# Patient Record
Sex: FEMALE | Race: WHITE | NOT HISPANIC OR LATINO | Employment: PART TIME | URBAN - METROPOLITAN AREA
[De-identification: names, ages, dates, MRNs, and addresses within clinical notes are randomized per-mention and may not be internally consistent; named-entity substitution may affect disease eponyms.]

---

## 2019-07-15 ENCOUNTER — APPOINTMENT (OUTPATIENT)
Dept: LAB | Facility: HOSPITAL | Age: 24
End: 2019-07-15

## 2019-07-15 ENCOUNTER — LAB (OUTPATIENT)
Dept: LAB | Facility: HOSPITAL | Age: 24
End: 2019-07-15

## 2019-07-15 ENCOUNTER — OFFICE VISIT (OUTPATIENT)
Dept: INTERNAL MEDICINE | Facility: CLINIC | Age: 24
End: 2019-07-15

## 2019-07-15 VITALS
BODY MASS INDEX: 33.43 KG/M2 | SYSTOLIC BLOOD PRESSURE: 122 MMHG | HEIGHT: 66 IN | HEART RATE: 56 BPM | TEMPERATURE: 98.4 F | DIASTOLIC BLOOD PRESSURE: 78 MMHG | WEIGHT: 208 LBS

## 2019-07-15 DIAGNOSIS — Z13.29 ENCOUNTER FOR SCREENING FOR ENDOCRINE DISORDER: ICD-10-CM

## 2019-07-15 DIAGNOSIS — Z13.21 ENCOUNTER FOR VITAMIN DEFICIENCY SCREENING: ICD-10-CM

## 2019-07-15 DIAGNOSIS — Z13.0 SCREENING FOR DEFICIENCY ANEMIA: ICD-10-CM

## 2019-07-15 DIAGNOSIS — R63.5 WEIGHT GAIN: ICD-10-CM

## 2019-07-15 DIAGNOSIS — J03.90 TONSILLITIS: ICD-10-CM

## 2019-07-15 DIAGNOSIS — Z13.220 SCREENING CHOLESTEROL LEVEL: ICD-10-CM

## 2019-07-15 DIAGNOSIS — Z30.9 ENCOUNTER FOR CONTRACEPTIVE MANAGEMENT, UNSPECIFIED TYPE: ICD-10-CM

## 2019-07-15 DIAGNOSIS — F41.9 ANXIETY: Primary | ICD-10-CM

## 2019-07-15 PROBLEM — R73.09 ABNORMAL GLUCOSE: Status: ACTIVE | Noted: 2019-07-15

## 2019-07-15 PROBLEM — E16.2 HYPOGLYCEMIA: Status: ACTIVE | Noted: 2019-07-15

## 2019-07-15 PROBLEM — G43.009 MIGRAINE WITHOUT AURA AND WITHOUT STATUS MIGRAINOSUS, NOT INTRACTABLE: Status: ACTIVE | Noted: 2019-07-15

## 2019-07-15 PROBLEM — J30.9 ALLERGIC RHINITIS: Status: ACTIVE | Noted: 2019-07-15

## 2019-07-15 LAB
25(OH)D3 SERPL-MCNC: 19.3 NG/ML (ref 30–100)
ALBUMIN SERPL-MCNC: 4.2 G/DL (ref 3.5–5.2)
ALBUMIN/GLOB SERPL: 1.4 G/DL
ALP SERPL-CCNC: 49 U/L (ref 39–117)
ALT SERPL W P-5'-P-CCNC: 28 U/L (ref 1–33)
ANION GAP SERPL CALCULATED.3IONS-SCNC: 12.4 MMOL/L (ref 5–15)
AST SERPL-CCNC: 23 U/L (ref 1–32)
BASOPHILS # BLD AUTO: 0.02 10*3/MM3 (ref 0–0.2)
BASOPHILS NFR BLD AUTO: 0.4 % (ref 0–1.5)
BILIRUB SERPL-MCNC: 0.3 MG/DL (ref 0.2–1.2)
BUN BLD-MCNC: 7 MG/DL (ref 6–20)
BUN/CREAT SERPL: 10.3 (ref 7–25)
CALCIUM SPEC-SCNC: 9.9 MG/DL (ref 8.6–10.5)
CHLORIDE SERPL-SCNC: 105 MMOL/L (ref 98–107)
CHOLEST SERPL-MCNC: 208 MG/DL (ref 0–200)
CO2 SERPL-SCNC: 21.6 MMOL/L (ref 22–29)
CREAT BLD-MCNC: 0.68 MG/DL (ref 0.57–1)
DEPRECATED RDW RBC AUTO: 44.7 FL (ref 37–54)
EOSINOPHIL # BLD AUTO: 0.05 10*3/MM3 (ref 0–0.4)
EOSINOPHIL NFR BLD AUTO: 0.9 % (ref 0.3–6.2)
ERYTHROCYTE [DISTWIDTH] IN BLOOD BY AUTOMATED COUNT: 13.1 % (ref 12.3–15.4)
GFR SERPL CREATININE-BSD FRML MDRD: 108 ML/MIN/1.73
GLOBULIN UR ELPH-MCNC: 2.9 GM/DL
GLUCOSE BLD-MCNC: 87 MG/DL (ref 65–99)
HCT VFR BLD AUTO: 41.9 % (ref 34–46.6)
HDLC SERPL-MCNC: 58 MG/DL (ref 40–60)
HGB BLD-MCNC: 13.8 G/DL (ref 12–15.9)
IMM GRANULOCYTES # BLD AUTO: 0.01 10*3/MM3 (ref 0–0.05)
IMM GRANULOCYTES NFR BLD AUTO: 0.2 % (ref 0–0.5)
LDLC SERPL CALC-MCNC: 130 MG/DL (ref 0–100)
LDLC/HDLC SERPL: 2.24 {RATIO}
LYMPHOCYTES # BLD AUTO: 2.27 10*3/MM3 (ref 0.7–3.1)
LYMPHOCYTES NFR BLD AUTO: 40.8 % (ref 19.6–45.3)
MCH RBC QN AUTO: 30.7 PG (ref 26.6–33)
MCHC RBC AUTO-ENTMCNC: 32.9 G/DL (ref 31.5–35.7)
MCV RBC AUTO: 93.3 FL (ref 79–97)
MONOCYTES # BLD AUTO: 0.37 10*3/MM3 (ref 0.1–0.9)
MONOCYTES NFR BLD AUTO: 6.7 % (ref 5–12)
NEUTROPHILS # BLD AUTO: 2.84 10*3/MM3 (ref 1.7–7)
NEUTROPHILS NFR BLD AUTO: 51 % (ref 42.7–76)
NRBC BLD AUTO-RTO: 0 /100 WBC (ref 0–0.2)
PLATELET # BLD AUTO: 313 10*3/MM3 (ref 140–450)
PMV BLD AUTO: 10.4 FL (ref 6–12)
POTASSIUM BLD-SCNC: 4.5 MMOL/L (ref 3.5–5.2)
PROT SERPL-MCNC: 7.1 G/DL (ref 6–8.5)
RBC # BLD AUTO: 4.49 10*6/MM3 (ref 3.77–5.28)
SODIUM BLD-SCNC: 139 MMOL/L (ref 136–145)
TRIGL SERPL-MCNC: 101 MG/DL (ref 0–150)
TSH SERPL DL<=0.05 MIU/L-ACNC: 3.88 MIU/ML (ref 0.27–4.2)
VIT B12 BLD-MCNC: 312 PG/ML (ref 211–946)
VLDLC SERPL-MCNC: 20.2 MG/DL (ref 5–40)
WBC NRBC COR # BLD: 5.56 10*3/MM3 (ref 3.4–10.8)

## 2019-07-15 PROCEDURE — 99214 OFFICE O/P EST MOD 30 MIN: CPT | Performed by: NURSE PRACTITIONER

## 2019-07-15 PROCEDURE — 80061 LIPID PANEL: CPT

## 2019-07-15 PROCEDURE — 82306 VITAMIN D 25 HYDROXY: CPT

## 2019-07-15 PROCEDURE — 80053 COMPREHEN METABOLIC PANEL: CPT

## 2019-07-15 PROCEDURE — 85025 COMPLETE CBC W/AUTO DIFF WBC: CPT

## 2019-07-15 PROCEDURE — 82607 VITAMIN B-12: CPT

## 2019-07-15 PROCEDURE — 84443 ASSAY THYROID STIM HORMONE: CPT

## 2019-07-15 RX ORDER — NORETHINDRONE ACETATE AND ETHINYL ESTRADIOL 1MG-20(21)
1 KIT ORAL DAILY
COMMUNITY
End: 2020-03-06

## 2019-07-15 RX ORDER — FLUOXETINE HYDROCHLORIDE 20 MG/1
1 CAPSULE ORAL DAILY
Refills: 10 | COMMUNITY
Start: 2019-05-20 | End: 2019-07-15

## 2019-07-15 RX ORDER — CHLORAL HYDRATE 500 MG
1000 CAPSULE ORAL
COMMUNITY
End: 2020-03-06

## 2019-07-15 RX ORDER — FLUOXETINE HYDROCHLORIDE 20 MG/1
20 CAPSULE ORAL DAILY
Qty: 30 CAPSULE | Refills: 2 | Status: SHIPPED | OUTPATIENT
Start: 2019-07-15 | End: 2020-01-14

## 2019-07-15 NOTE — PATIENT INSTRUCTIONS
" It may take 4-6 weeks to notice improvement.  If experience increased depression or develop suicidal thoughts contact me immediately at  my office.  This medication she not be stopped suddenly.    Labs today.    Restart Flonase daily.  Referral to ENT    The patient was counseled on goals and the need for weight reduction.   The body mass index (BMI), which is a ratio of weight to height, while not a perfect measurement of body fat, does correlate strongly with various metabolic and disease outcomes. A BMI in the 18.5 - 24.9 range is considered a normal or healthy weight. A BMI above 25 is considered overweight.  A BMI of 30 or above is considered obese.  A BMI of 35 and above with any chronic conditions (such as hypertension, hyperlipidemia or Coronary Artery Disease is considered morbidly obese. Even an initial 10% reduction in body weight can offer important health benefits and reduce long-term cardiovascular risk.     While there are an array of popular/fad diets being promoted, I generally recommend a diet of vegetables, fruits, whole grains, and lean protein sources including low-fat dairy products, poultry, and nuts with an emphasis toward minimizing intake of sweets, carbohydrates, and red meats. Obtaining 30-40 minutes of moderate to vigorous-intensity aerobic exercise on 4-5 days weekly will assist you in reaching your optimal weight, not to mention the cardiovascular benefit, although no amount of exercise will usually overcome the effects of a poor diet. (Moderate activity means you can talk, but not sing, while you are active.) While this may seem like an insurmountable task at first to some, adding 10 minutes of aerobic activity to your current amount of exercise gradually can help you reach the above goal. The National [place] of [employer]'s \"Aim for a Healthy Weight\" website offers practical information on how you may improve your current diet and exercise habits. I have included the website's URL " here for your reference: https://www.nhlbi.nih.gov/health/educational/lose_wt/index.htm. A nutritionist can help support you in developing an individualized diet plan. If you are interested, I would be happy to place a referral. Just let me know.

## 2019-07-15 NOTE — PROGRESS NOTES
"Dana Juarez  1995  [id number]  Patient Care Team:  Rosendo Hernandez MD as PCP - General (Internal Medicine)    Dana Juarez is a pleasant 24 y.o. female who presents for evaluation of Weight Loss (Patient is not having success and would like to get lab work done ) and swollen tonsils (mono and strep last yezar and her tonsils are still swollen )    This patient is accompanied by their mother who contributes to the history of their care.  Chief Complaint   Patient presents with   • Weight Loss     Patient is not having success and would like to get lab work done    • swollen tonsils     mono and strep last yezar and her tonsils are still swollen        HPI:   SHAILESH/panic attacks: Was on escitalopram for 18 mo which helped some but not completely effective.  She thought she should try a different SSRI and was put on prozac for a month, then she stopped try naturals about 6 weeks ago.  \"I realized this was not a good idea\". anxiety and depression have increased sig over past 2-3 weeks. Having panic attacks again, was 1-2 x weekly before prozac, when on SSRIs much less frequent.  No known triggers.  Has had 2 contacts in past 6-8 weeks.  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    Weight gain: on oral CLEMENTE x 4 years and has had about 40-50 lbs. same timeframe of going to college. Last mo joined weight watchers.  No reg exercise.  Tracking intake on their alfonzo.Wants labs.    Frequent ear aches and tonsillitis for past 2 yrs.  Last yr strep and mono, tonsils still swollen,  Non smoker, no snoring.    Past Medical History:   Diagnosis Date   • Irregular periods     heavy periods   • Seasonal allergies      History reviewed. No pertinent surgical history.  History reviewed. No pertinent family history.  Social History     Tobacco Use   Smoking Status Former Smoker   • Packs/day: 0.25   • Years: 0.50   • Pack years: 0.12   • " "Start date:    • Last attempt to quit: 2018   • Years since quittin.5   Smokeless Tobacco Never Used   Tobacco Comment    Smoked 6 months last year while in [place] for school      Allergies   Allergen Reactions   • Penicillins Rash     Possible allergy        Current Outpatient Medications:   •  norethindrone-ethinyl estradiol FE (JUNEL FE 1/20) 1-20 MG-MCG per tablet, Take 1 tablet by mouth Daily., Disp: , Rfl:   •  Nutritional Supplements (ADULT NUTRITIONAL SUPPLEMENT + PO), Adrenal Support- Take 1 tablet by oral route once daily, Disp: , Rfl:   •  Nutritional Supplements (ADULT NUTRITIONAL SUPPLEMENT PO), Anxiety: Take 3-5 tablets by mouth daily prn, Disp: , Rfl:   •  Omega-3 Fatty Acids (FISH OIL) 1000 MG capsule capsule, Take 1,000 mg by mouth Daily With Breakfast., Disp: , Rfl:   •  FLUoxetine (PROZAC) 20 MG capsule, Take 1 capsule by mouth Daily., Disp: 30 capsule, Rfl: 2    Review of Systems   Constitutional: Negative for chills, fatigue and fever.   HENT: Positive for ear pain. Negative for congestion and sinus pressure.    Respiratory: Positive for cough. Negative for chest tightness, shortness of breath and wheezing.    Cardiovascular: Negative for chest pain and palpitations.   Gastrointestinal: Negative for abdominal pain, blood in stool and constipation.   Skin: Negative for color change.   Allergic/Immunologic: Positive for environmental allergies.   Neurological: Positive for dizziness. Negative for speech difficulty and headache.   Psychiatric/Behavioral: Positive for decreased concentration. The patient is nervous/anxious.      /78 (BP Location: Left arm, Patient Position: Sitting, Cuff Size: Adult)   Pulse 56   Temp 98.4 °F (36.9 °C) (Temporal)   Ht 167.6 cm (66\")   Wt 94.3 kg (208 lb)   BMI 33.57 kg/m²     Physical Exam   Constitutional: She appears well-developed and well-nourished.   HENT:   Head: Normocephalic and atraumatic.   Right Ear: External ear normal. A middle ear " effusion is present.   Left Ear: External ear normal. A middle ear effusion is present.   Mouth/Throat: Uvula is midline. Posterior oropharyngeal erythema present. No oropharyngeal exudate, posterior oropharyngeal edema or tonsillar abscesses. Tonsils are 1+ on the right. Tonsils are 2+ on the left. No tonsillar exudate.   Eyes: Conjunctivae and EOM are normal.   Neck: Normal range of motion. Neck supple.   Cardiovascular: Normal rate, regular rhythm and normal heart sounds.   Pulmonary/Chest: Effort normal and breath sounds normal.   Abdominal: Soft. Bowel sounds are normal.   Musculoskeletal: Normal range of motion.   Lymphadenopathy:     She has no cervical adenopathy.   Neurological: She is alert.   Skin: Skin is warm and dry.   Psychiatric: She has a normal mood and affect. Her speech is normal and behavior is normal. Judgment and thought content normal. Her mood appears not anxious. Her affect is not angry, not blunt, not labile and not inappropriate. Cognition and memory are normal. She does not exhibit a depressed mood. She expresses no homicidal and no suicidal ideation. She expresses no suicidal plans and no homicidal plans.     Assessment/Plan:  Dana was seen today for weight loss and swollen tonsils.    Diagnoses and all orders for this visit:    Anxiety    Encounter for contraceptive management, unspecified type  Comments:  Refer to gynecology  Orders:  -     Ambulatory Referral to Gynecology    Weight gain  Comments:  Participating in weight watchers    Tonsillitis  Comments:  Recurrent, refer to ENT  Orders:  -     Ambulatory Referral to ENT (Otolaryngology)    BMI 33.0-33.9,adult    Encounter for screening for endocrine disorder  -     TSH Rfx On Abnormal To Free T4; Future    Screening cholesterol level  -     Comprehensive Metabolic Panel; Future  -     Lipid Panel; Future    Screening for deficiency anemia  -     CBC & Differential; Future    Encounter for vitamin deficiency screening  -      Vitamin D 25 Hydroxy; Future  -     Vitamin B12; Future    Other orders  -     FLUoxetine (PROZAC) 20 MG capsule; Take 1 capsule by mouth Daily.       Patient Instructions    It may take 4-6 weeks to notice improvement.  If experience increased depression or develop suicidal thoughts contact me immediately at  my office.  This medication she not be stopped suddenly.    Labs today.    Restart Flonase daily.  Referral to ENT    The patient was counseled on goals and the need for weight reduction.   The body mass index (BMI), which is a ratio of weight to height, while not a perfect measurement of body fat, does correlate strongly with various metabolic and disease outcomes. A BMI in the 18.5 - 24.9 range is considered a normal or healthy weight. A BMI above 25 is considered overweight.  A BMI of 30 or above is considered obese.  A BMI of 35 and above with any chronic conditions (such as hypertension, hyperlipidemia or Coronary Artery Disease is considered morbidly obese. Even an initial 10% reduction in body weight can offer important health benefits and reduce long-term cardiovascular risk.     While there are an array of popular/fad diets being promoted, I generally recommend a diet of vegetables, fruits, whole grains, and lean protein sources including low-fat dairy products, poultry, and nuts with an emphasis toward minimizing intake of sweets, carbohydrates, and red meats. Obtaining 30-40 minutes of moderate to vigorous-intensity aerobic exercise on 4-5 days weekly will assist you in reaching your optimal weight, not to mention the cardiovascular benefit, although no amount of exercise will usually overcome the effects of a poor diet. (Moderate activity means you can talk, but not sing, while you are active.) While this may seem like an insurmountable task at first to some, adding 10 minutes of aerobic activity to your current amount of exercise gradually can help you reach the above goal. The [place]  "of Health's \"Aim for a Healthy Weight\" website offers practical information on how you may improve your current diet and exercise habits. I have included the website's URL here for your reference: https://www.nhlbi.nih.gov/health/educational/lose_wt/index.htm. A nutritionist can help support you in developing an individualized diet plan. If you are interested, I would be happy to place a referral. Just let me know.           Plan of care reviewed with patient at the conclusion of today's visit. Education was provided regarding diagnosis, management and any prescribed or recommended OTC medications.  Patient verbalizes understanding of and agreement with management plan.    Return in about 1 month (around 8/15/2019).    *Note that portions of this note were completed with a voice recognition program.  Efforts were made to edit the dictation but occasionally words are transcribed.    CHRISTINA Martinez          "

## 2019-07-17 DIAGNOSIS — E78.2 MIXED HYPERLIPIDEMIA: ICD-10-CM

## 2019-07-17 DIAGNOSIS — R53.83 OTHER FATIGUE: ICD-10-CM

## 2019-07-17 DIAGNOSIS — E55.9 VITAMIN D DEFICIENCY: Primary | ICD-10-CM

## 2019-08-01 RX ORDER — NORETHINDRONE ACETATE AND ETHINYL ESTRADIOL AND FERROUS FUMARATE 1MG-20(21)
KIT ORAL
Qty: 112 TABLET | Refills: 0 | OUTPATIENT
Start: 2019-08-01

## 2020-01-14 RX ORDER — FLUOXETINE HYDROCHLORIDE 20 MG/1
20 CAPSULE ORAL DAILY
Qty: 30 CAPSULE | Refills: 2 | Status: SHIPPED | OUTPATIENT
Start: 2020-01-14 | End: 2020-05-08

## 2020-02-21 ENCOUNTER — TELEPHONE (OUTPATIENT)
Dept: INTERNAL MEDICINE | Facility: CLINIC | Age: 25
End: 2020-02-21

## 2020-02-21 NOTE — TELEPHONE ENCOUNTER
Repeat Lipis B12 Vit D ordered 7/17/19  Reminder letter sent to patient.   Labs have not been completed  Can order be canceled?

## 2020-03-06 ENCOUNTER — OFFICE VISIT (OUTPATIENT)
Dept: INTERNAL MEDICINE | Facility: CLINIC | Age: 25
End: 2020-03-06

## 2020-03-06 VITALS
BODY MASS INDEX: 35.36 KG/M2 | TEMPERATURE: 98.1 F | DIASTOLIC BLOOD PRESSURE: 80 MMHG | OXYGEN SATURATION: 98 % | HEART RATE: 60 BPM | SYSTOLIC BLOOD PRESSURE: 120 MMHG | WEIGHT: 220 LBS | HEIGHT: 66 IN | RESPIRATION RATE: 28 BRPM

## 2020-03-06 DIAGNOSIS — J35.1 TONSILLAR HYPERTROPHY: ICD-10-CM

## 2020-03-06 DIAGNOSIS — IMO0002 HX OF SELF-HARM: ICD-10-CM

## 2020-03-06 DIAGNOSIS — R41.840 POOR CONCENTRATION: ICD-10-CM

## 2020-03-06 DIAGNOSIS — F41.1 GAD (GENERALIZED ANXIETY DISORDER): Primary | ICD-10-CM

## 2020-03-06 DIAGNOSIS — F33.1 MODERATE EPISODE OF RECURRENT MAJOR DEPRESSIVE DISORDER (HCC): ICD-10-CM

## 2020-03-06 PROCEDURE — 99214 OFFICE O/P EST MOD 30 MIN: CPT | Performed by: NURSE PRACTITIONER

## 2020-03-06 RX ORDER — BUPROPION HYDROCHLORIDE 150 MG/1
150 TABLET ORAL EVERY MORNING
Qty: 30 TABLET | Refills: 2 | Status: SHIPPED | OUTPATIENT
Start: 2020-03-06 | End: 2020-04-01 | Stop reason: SDUPTHER

## 2020-03-06 RX ORDER — BUSPIRONE HYDROCHLORIDE 5 MG/1
5 TABLET ORAL 3 TIMES DAILY PRN
Qty: 90 TABLET | Refills: 2 | Status: SHIPPED | OUTPATIENT
Start: 2020-03-06 | End: 2020-05-08

## 2020-03-06 NOTE — PATIENT INSTRUCTIONS
Suicidal Feelings: How to Help Yourself  Suicide is when you end your own life. There are many things you can do to help yourself feel better when struggling with these feelings. Many services and people are available to support you and others who struggle with similar feelings.   If you ever feel like you may hurt yourself or others, or have thoughts about taking your own life, get help right away. To get help:  · Call your local emergency services (911 in the [place]  · Go to your nearest emergency department.  · Call a suicide hotline to speak with a trained counselor. The following suicide hotlines are available in the [place]:  ? 3-491-913-TALK (1-298.175.7977).  ? 6-680-RQVEMWK (1-275.935.1525).  ? 1-727.957.8721. This is a hotline for Chinese speakers.  ? 1-894.312.8343. This is a hotline for TTY users.  ? 5-949-8-U-DREW (1-141.353.6927). This is a hotline for lesbian, yi, bisexual, transgender, or questioning youth.  ? For a list of hotlines in [place], visit www.suicide.org/hotlines/international/eeavpi-jimuvkb-qbmzjmju.html  · Contact a crisis center or a local suicide prevention center. To find a crisis center or suicide prevention center:  ? Call your local hospital, clinic, community service organization, mental health center, social service provider, or health department. Ask for help with connecting to a crisis center.  ? For a list of crisis centers in the [place], visit: suicidepreventionlifeline.org  ? For a list of crisis centers in [place], visit: suicideprevention.ca  How to help yourself feel better    · Promise yourself that you will not do anything extreme when you have suicidal feelings. Remember, there is hope. Many people have gotten through suicidal thoughts and feelings, and you can too. If you have had these feelings before, remind yourself that you can get through them again.  · Let family, friends, teachers, or counselors know how you are feeling. Try not to separate  yourself from those who care about you and want to help you. Talk with someone every day, even if you do not feel sociable. Face-to-face conversation is best to help them understand your feelings.  · Contact a mental health care provider and work with this person regularly.  · Make a safety plan that you can follow during a crisis. Include phone numbers of suicide prevention hotlines, mental health professionals, and trusted friends and family members you can call during an emergency. Save these numbers on your phone.  · If you are thinking of taking a lot of medicine, give your medicine to someone who can give it to you as prescribed. If you are on antidepressants and are concerned you will overdose, tell your health care provider so that he or she can give you safer medicines.  · Try to stick to your routines. Follow a schedule every day. Make self-care a priority.  · Make a list of realistic goals, and cross them off when you achieve them. Accomplishments can give you a sense of worth.  · Wait until you are feeling better before doing things that you find difficult or unpleasant.  · Do things that you have always enjoyed to take your mind off your feelings. Try reading a book, or listening to or playing music. Spending time outside, in nature, may help you feel better.  Follow these instructions at home:    · Visit your primary health care provider every year for a checkup.  · Work with a mental health care provider as needed.  · Eat a well-balanced diet, and eat regular meals.  · Get plenty of rest.  · Exercise if you are able. Just 30 minutes of exercise each day can help you feel better.  · Take over-the-counter and prescription medicines only as told by your health care provider. Ask your mental health care provider about the possible side effects of any medicines you are taking.  · Do not use alcohol or drugs, and remove these substances from your home.  · Remove weapons, poisons, knives, and other deadly  items from your home.  General recommendations  · Keep your living space well lit.  · When you are feeling well, write yourself a letter with tips and support that you can read when you are not feeling well.  · Remember that life's difficulties can be sorted out with help. Conditions can be treated, and you can learn behaviors and ways of thinking that will help you.  Where to find more information  · National Suicide Prevention Lifeline: www.suicidepreventionlifeline.org  · Hopeline: www.hopeline.AXADO  · American Foundation for Suicide Prevention: www.afsp.org  · The [place] Project (for lesbian, yi, bisexual, transgender, or questioning youth): www.thetrevorproject.org  Contact a health care provider if:  · You feel as though you are a burden to others.  · You feel agitated, angry, vengeful, or have extreme mood swings.  · You have withdrawn from family and friends.  Get help right away if:  · You are talking about suicide or wishing to die.  · You start making plans for how to commit suicide.  · You feel that you have no reason to live.  · You start making plans for putting your affairs in order, saying goodbye, or giving your possessions away.  · You feel guilt, shame, or unbearable pain, and it seems like there is no way out.  · You are frequently using drugs or alcohol.  · You are engaging in risky behaviors that could lead to death.  If you have any of these symptoms, get help right away. Call emergency services, go to your nearest emergency department or crisis center, or call a suicide crisis helpline.  Summary  · Suicide is when you take your own life.  · Promise yourself that you will not do anything extreme when you have suicidal feelings.  · Let family, friends, teachers, or counselors know how you are feeling.  · Get help right away if you feel as though life is getting too tough to handle and you are thinking about suicide.  This information is not intended to replace advice given to you by your health  care provider. Make sure you discuss any questions you have with your health care provider.  Document Released: 06/23/2004 Document Revised: 07/31/2018 Document Reviewed: 07/31/2018  [employer]vier Interactive Patient Education © 2020 [employer]

## 2020-03-06 NOTE — PROGRESS NOTES
"Subjective   Dana Juarez is a 24 y.o. female here today for anxiety/depression/ possible ADD.    Chief Complaint   Patient presents with   • Depression     prozac works but not like it should with anxiety    • PATRICIO Cortez is here today with about a 3-4 year history of anxiety and depression.  She has days when she will feel very \"down\" and not want to get out of bed.  Her mother will have to \"drag her\" out of bed to do things.  She does also have excessive worry.  She does have a history of self-harm, has burned herself in the past.  She has done this within the last month.  No suicidal thoughts or plan for suicide.  She does have a family history of anxiety depression and ADD.  She does wonder if she has ADD as well.  She has trouble finishing a paragraph when reading, completing her thoughts, or staying on task.  She did not necessarily struggle in school but was always a \"slow reader\".  She feels that the SSRI class may not be best for her and is interested in trying Wellbutrin.  Sushant 7, PHQ 9, mood disorder questionnaire completed.    She also reports after an episode of mono approximately 2 years ago she has had enlarged tonsils and they have not gone back down.  She does snore and get frequent sore throats.  She was told that she would need her tonsils removed but failed to follow through with ENT referral would like to follow through with this at this time.    Review of Systems   Constitutional: Negative for activity change, appetite change, chills, fever, unexpected weight gain and unexpected weight loss.   HENT: Positive for sore throat. Negative for congestion, ear pain, nosebleeds, postnasal drip, trouble swallowing and voice change.    Eyes: Negative for blurred vision, pain, itching and visual disturbance.   Respiratory: Negative for cough and shortness of breath.    Cardiovascular: Negative for chest pain and palpitations.   Gastrointestinal: Negative for blood in stool, diarrhea, nausea and " vomiting.   Endocrine: Negative for polydipsia, polyphagia and polyuria.   Genitourinary: Negative for dysuria, flank pain, frequency, genital sores, hematuria and urgency.   Musculoskeletal: Negative for arthralgias and myalgias.   Skin: Negative for rash and skin lesions.   Allergic/Immunologic: Negative for environmental allergies, food allergies and immunocompromised state.   Neurological: Negative for dizziness, seizures, weakness, headache, memory problem and confusion.   Psychiatric/Behavioral: Positive for decreased concentration, self-injury, sleep disturbance, depressed mood and stress. Negative for suicidal ideas. The patient is nervous/anxious.        Past Medical History:   Diagnosis Date   • Acid reflux    • ADHD (attention deficit hyperactivity disorder)    • Anxiety    • Depression    • Irregular periods     heavy periods   • Seasonal allergies      Family History   Problem Relation Age of Onset   • COPD Maternal Grandmother    • COPD Maternal Grandfather      Past Surgical History:   Procedure Laterality Date   • WISDOM TOOTH EXTRACTION       Social History     Socioeconomic History   • Marital status: Single     Spouse name: Not on file   • Number of children: Not on file   • Years of education: Not on file   • Highest education level: Not on file   Tobacco Use   • Smoking status: Former Smoker     Packs/day: 0.25     Years: 0.00     Pack years: 0.00     Types: Cigarettes     Start date:      Last attempt to quit: 2018     Years since quittin.1   • Smokeless tobacco: Never Used   • Tobacco comment: Smoked 6 months last year while in [place] for school    Substance and Sexual Activity   • Alcohol use: Yes     Comment: socially    • Drug use: Never         Current Outpatient Medications:   •  FLUoxetine (PROzac) 20 MG capsule, TAKE 1 CAPSULE BY MOUTH DAILY, Disp: 30 capsule, Rfl: 2  •  buPROPion XL (WELLBUTRIN XL) 150 MG 24 hr tablet, Take 1 tablet by mouth Every Morning., Disp: 30 tablet,  "Rfl: 2  •  busPIRone (BUSPAR) 5 MG tablet, Take 1 tablet by mouth 3 (Three) Times a Day As Needed (anxiety)., Disp: 90 tablet, Rfl: 2    Objective   Vitals:    03/06/20 1047   BP: 120/80   Pulse: 60   Resp: 28   Temp: 98.1 °F (36.7 °C)   TempSrc: Temporal   SpO2: 98%   Weight: 99.8 kg (220 lb)   Height: 167.6 cm (66\")     Body mass index is 35.51 kg/m².    Physical Exam   Constitutional: She is oriented to person, place, and time. She appears well-developed and well-nourished. No distress.   HENT:   Head: Normocephalic and atraumatic.   Mouth/Throat: Tonsils are 2+ on the right. Tonsils are 3+ on the left. No tonsillar exudate.   Eyes: Pupils are equal, round, and reactive to light.   Neck: Neck supple. No thyromegaly present.   Cardiovascular: Normal rate and regular rhythm.   Pulmonary/Chest: Effort normal and breath sounds normal.   Abdominal: Soft. Bowel sounds are normal. She exhibits no distension.   Neurological: She is alert and oriented to person, place, and time.   Skin: Skin is warm and dry. Capillary refill takes 2 to 3 seconds. She is not diaphoretic.   Psychiatric: She has a normal mood and affect. Her behavior is normal. Judgment and thought content normal.   Nursing note and vitals reviewed.      Assessment/Plan   Problem List Items Addressed This Visit        Other    Poor concentration    Relevant Orders    Ambulatory Referral to Behavioral Health    SHAILESH (generalized anxiety disorder) - Primary    Relevant Medications    FLUoxetine (PROzac) 20 MG capsule    buPROPion XL (WELLBUTRIN XL) 150 MG 24 hr tablet    busPIRone (BUSPAR) 5 MG tablet    Other Relevant Orders    Ambulatory Referral to Behavioral Health    Moderate episode of recurrent major depressive disorder (CMS/HCC)    Relevant Medications    FLUoxetine (PROzac) 20 MG capsule    buPROPion XL (WELLBUTRIN XL) 150 MG 24 hr tablet    busPIRone (BUSPAR) 5 MG tablet    Other Relevant Orders    Ambulatory Referral to Behavioral Health      Other " Visit Diagnoses     Hx of self-harm        Tonsillar hypertrophy        Relevant Orders    Ambulatory Referral to ENT (Otolaryngology)          Dana was seen today for depression and add.    Diagnoses and all orders for this visit:    SHAILESH (generalized anxiety disorder)  -     Ambulatory Referral to Behavioral Health  -     busPIRone (BUSPAR) 5 MG tablet; Take 1 tablet by mouth 3 (Three) Times a Day As Needed (anxiety).  Wellbutrin may worsen anxiety, will start BuSpar 3 times daily as needed and follow-up in 2 weeks  Poor concentration  -     Ambulatory Referral to Behavioral Health  Advise she will need a formal ADD evaluation and refer to behavioral health  Moderate episode of recurrent major depressive disorder (CMS/HCC)  -     Ambulatory Referral to Behavioral Health  -     buPROPion XL (WELLBUTRIN XL) 150 MG 24 hr tablet; Take 1 tablet by mouth Every Morning.  We will try Wellbutrin but did discuss this may worsen anxiety.  Continue with Prozac for now will consider weaning at follow-up.  Discussed side effects of Wellbutrin and follow-up in 2 weeks.  We also discussed the suicide hotline call, [facility], need for counseling, and self-harm check groups.  Education provided and after visit summary  Hx of self-harm  See above  Tonsillar hypertrophy  -     Ambulatory Referral to ENT (Otolaryngology)             Plan of care reviewed with the patient at the conclusion of today's visit.  Education was provided regarding diagnosis, management, and any prescribed or recommended OTC medications.  Patient verbalized understanding of and agreement with management plan.     Return in about 2 weeks (around 3/20/2020), or if symptoms worsen or fail to improve.      Nia Perez, APRN    Please note that portions of this note were completed with a voice recognition program. Efforts were made to edit the dictations, but occasionally words are mistranscribed.

## 2020-04-01 DIAGNOSIS — F33.1 MODERATE EPISODE OF RECURRENT MAJOR DEPRESSIVE DISORDER (HCC): ICD-10-CM

## 2020-04-01 DIAGNOSIS — L08.9 SKIN INFECTION: Primary | ICD-10-CM

## 2020-04-01 RX ORDER — BACITRACIN, NEOMYCIN, POLYMYXIN B 400; 3.5; 5 [USP'U]/G; MG/G; [USP'U]/G
OINTMENT TOPICAL 2 TIMES DAILY
Qty: 28.35 G | Refills: 0 | Status: SHIPPED | OUTPATIENT
Start: 2020-04-01 | End: 2020-05-08

## 2020-04-01 RX ORDER — BUPROPION HYDROCHLORIDE 150 MG/1
150 TABLET ORAL EVERY MORNING
Qty: 30 TABLET | Refills: 2 | Status: SHIPPED | OUTPATIENT
Start: 2020-04-01 | End: 2020-05-08

## 2020-04-06 DIAGNOSIS — L01.00 IMPETIGO: Primary | ICD-10-CM

## 2020-04-06 RX ORDER — DOXYCYCLINE 100 MG/1
100 CAPSULE ORAL 2 TIMES DAILY
Qty: 14 CAPSULE | Refills: 0 | Status: SHIPPED | OUTPATIENT
Start: 2020-04-06 | End: 2020-05-08

## 2020-05-08 ENCOUNTER — OFFICE VISIT (OUTPATIENT)
Dept: INTERNAL MEDICINE | Facility: CLINIC | Age: 25
End: 2020-05-08

## 2020-05-08 VITALS
RESPIRATION RATE: 18 BRPM | BODY MASS INDEX: 35.2 KG/M2 | HEIGHT: 66 IN | OXYGEN SATURATION: 98 % | SYSTOLIC BLOOD PRESSURE: 114 MMHG | TEMPERATURE: 98.2 F | HEART RATE: 66 BPM | WEIGHT: 219 LBS | DIASTOLIC BLOOD PRESSURE: 70 MMHG

## 2020-05-08 DIAGNOSIS — L30.4 INTERTRIGO: Primary | ICD-10-CM

## 2020-05-08 PROCEDURE — 99213 OFFICE O/P EST LOW 20 MIN: CPT | Performed by: NURSE PRACTITIONER

## 2020-05-08 RX ORDER — NYSTATIN 100000 U/G
OINTMENT TOPICAL 2 TIMES DAILY
Qty: 30 G | Refills: 0 | Status: SHIPPED | OUTPATIENT
Start: 2020-05-08 | End: 2020-05-17 | Stop reason: SDUPTHER

## 2020-05-08 RX ORDER — LISDEXAMFETAMINE DIMESYLATE 30 MG/1
CAPSULE ORAL
COMMUNITY
Start: 2020-04-27 | End: 2020-12-08

## 2020-05-08 RX ORDER — FLUCONAZOLE 150 MG/1
150 TABLET ORAL ONCE
Qty: 1 TABLET | Refills: 0 | Status: SHIPPED | OUTPATIENT
Start: 2020-05-08 | End: 2020-05-08

## 2020-05-17 DIAGNOSIS — L30.4 INTERTRIGO: ICD-10-CM

## 2020-05-18 RX ORDER — NYSTATIN 100000 U/G
OINTMENT TOPICAL 2 TIMES DAILY
Qty: 30 G | Refills: 0 | Status: SHIPPED | OUTPATIENT
Start: 2020-05-18 | End: 2020-12-08

## 2020-05-21 DIAGNOSIS — L30.4 INTERTRIGO: Primary | ICD-10-CM

## 2020-05-21 RX ORDER — KETOCONAZOLE 200 MG/1
200 TABLET ORAL DAILY
Qty: 14 TABLET | Refills: 0 | Status: SHIPPED | OUTPATIENT
Start: 2020-05-21 | End: 2020-12-08

## 2020-07-07 ENCOUNTER — TELEPHONE (OUTPATIENT)
Dept: INTERNAL MEDICINE | Facility: CLINIC | Age: 25
End: 2020-07-07

## 2020-07-07 NOTE — TELEPHONE ENCOUNTER
Patient requested a call back. Patient has a  headache, diarrhea, congestion, and fatigue. This was first noticed 3 days ago. Patient stated she has been around someone at the gym that has tested positive for COVID - 19, she is unsure of an exact time frame but does think it has been within the last 2 weeks. Patient would like to know where she can go to be tested for COVID -19.    Please call and advise. Patient call back [phone]

## 2020-09-25 ENCOUNTER — APPOINTMENT (OUTPATIENT)
Dept: PREADMISSION TESTING | Facility: HOSPITAL | Age: 25
End: 2020-09-25

## 2020-11-20 ENCOUNTER — TELEPHONE (OUTPATIENT)
Dept: INTERNAL MEDICINE | Facility: CLINIC | Age: 25
End: 2020-11-20

## 2020-11-20 DIAGNOSIS — L30.4 INTERTRIGO: Primary | ICD-10-CM

## 2020-11-20 RX ORDER — FLUCONAZOLE 150 MG/1
150 TABLET ORAL ONCE
Qty: 1 TABLET | Refills: 0 | Status: SHIPPED | OUTPATIENT
Start: 2020-11-20 | End: 2020-11-20

## 2020-11-20 NOTE — TELEPHONE ENCOUNTER
PATIENT HAS BUMPS AROUND HER BIKINI AREA AND DIDN'T KNOW IF SOMETHING COULD BE CALLED IN OR DOES SHE NEED TO BE REFERRED TO DERMATOLOGY; PLEASE CALL AND ADVISE      LAURA: [phone] OK TO LEAVE VOICEMAIL    [address]

## 2020-11-20 NOTE — TELEPHONE ENCOUNTER
If this is the same problem that she had before I called in Diflucan oral and miconazole cream.  If it is a different problem then I can take a look at it and see

## 2020-11-24 ENCOUNTER — OFFICE VISIT (OUTPATIENT)
Dept: INTERNAL MEDICINE | Facility: CLINIC | Age: 25
End: 2020-11-24

## 2020-11-24 VITALS
TEMPERATURE: 97.8 F | WEIGHT: 204.8 LBS | HEART RATE: 72 BPM | SYSTOLIC BLOOD PRESSURE: 120 MMHG | DIASTOLIC BLOOD PRESSURE: 80 MMHG | HEIGHT: 66 IN | BODY MASS INDEX: 32.92 KG/M2

## 2020-11-24 DIAGNOSIS — R07.89 COSTOCHONDRAL CHEST PAIN: Primary | ICD-10-CM

## 2020-11-24 DIAGNOSIS — L02.92 FURUNCLE: ICD-10-CM

## 2020-11-24 PROCEDURE — 99214 OFFICE O/P EST MOD 30 MIN: CPT | Performed by: NURSE PRACTITIONER

## 2020-11-24 RX ORDER — IBUPROFEN 800 MG/1
800 TABLET ORAL EVERY 8 HOURS PRN
Qty: 45 TABLET | Refills: 0 | Status: SHIPPED | OUTPATIENT
Start: 2020-11-24 | End: 2020-12-21

## 2020-11-24 NOTE — PROGRESS NOTES
Subjective   Dana Juarez is a 25 y.o. female here today for chest pain.    Chief Complaint   Patient presents with   • Chest Pain   Dana is here today with intermittent chest pain over the past 2 weeks.  It is not associate with activity.  Her chest is tender to touch and feels sore.  It did radiate down her left arm and was associated with shortness of breath although she feels that this was attributed to anxiety surrounding the chest pain.  She has recently been trying to lose weight and has been doing a lot of yoga.  Did recently have a URI but no longer having symptoms.  No fever or chills.  Also reports a lesion in her groin that will not heal and is painful.    Review of Systems   Constitutional: Negative for activity change, appetite change, chills, fever, unexpected weight gain and unexpected weight loss.   HENT: Negative for congestion, ear pain, nosebleeds, postnasal drip, sore throat, trouble swallowing and voice change.    Eyes: Negative for blurred vision, pain, itching and visual disturbance.   Respiratory: Positive for shortness of breath. Negative for cough.    Cardiovascular: Positive for chest pain. Negative for palpitations and leg swelling.   Gastrointestinal: Negative for blood in stool, diarrhea, nausea and vomiting.   Endocrine: Negative for polydipsia, polyphagia and polyuria.   Genitourinary: Negative for dysuria, flank pain, frequency, genital sores, hematuria and urgency.   Musculoskeletal: Negative for arthralgias and myalgias.   Skin: Positive for skin lesions. Negative for rash.   Allergic/Immunologic: Negative for environmental allergies, food allergies and immunocompromised state.   Neurological: Negative for dizziness, seizures, weakness, headache, memory problem and confusion.   Psychiatric/Behavioral: Positive for decreased concentration, self-injury, sleep disturbance, depressed mood and stress. Negative for suicidal ideas. The patient is nervous/anxious.          Improving, now seeing [facility]       Past Medical History:   Diagnosis Date   • Acid reflux    • ADHD (attention deficit hyperactivity disorder)    • Anxiety    • Depression    • Irregular periods     heavy periods   • Seasonal allergies      Family History   Problem Relation Age of Onset   • COPD Maternal Grandmother    • COPD Maternal Grandfather      Past Surgical History:   Procedure Laterality Date   • WISDOM TOOTH EXTRACTION       Social History     Socioeconomic History   • Marital status: Single     Spouse name: Not on file   • Number of children: Not on file   • Years of education: Not on file   • Highest education level: Not on file   Tobacco Use   • Smoking status: Former Smoker     Packs/day: 0.25     Years: 0.00     Pack years: 0.00     Types: Cigarettes     Start date:      Quit date: 2018     Years since quittin.8   • Smokeless tobacco: Never Used   • Tobacco comment: Smoked 6 months last year while in [place] for school    Substance and Sexual Activity   • Alcohol use: Yes     Comment: socially    • Drug use: Never         Current Outpatient Medications:   •  ibuprofen (ADVIL,MOTRIN) 800 MG tablet, Take 1 tablet by mouth Every 8 (Eight) Hours As Needed for Mild Pain ., Disp: 45 tablet, Rfl: 0  •  ketoconazole (NIZORAL) 200 MG tablet, Take 1 tablet by mouth Daily., Disp: 14 tablet, Rfl: 0  •  miconazole (Micatin) 2 % cream, Apply  topically to the appropriate area as directed 2 (Two) Times a Day for 7 days., Disp: 14 g, Rfl: 0  •  mupirocin (Bactroban) 2 % ointment, Apply  topically to the appropriate area as directed 3 (Three) Times a Day for 14 days., Disp: 30 g, Rfl: 0  •  nystatin (MYCOSTATIN) 645293 UNIT/GM ointment, Apply  topically to the appropriate area as directed 2 (Two) Times a Day. For 14 days, Disp: 30 g, Rfl: 0  •  VYVANSE 30 MG capsule, TK 1 C PO QAM, Disp: , Rfl:     Objective   Vitals:    20 0748   BP: 120/80   BP Location: Right arm   Patient Position:  "Sitting   Pulse: 72   Temp: 97.8 °F (36.6 °C)   TempSrc: Infrared   Weight: 92.9 kg (204 lb 12.8 oz)   Height: 167.6 cm (65.98\")     Body mass index is 33.07 kg/m².  Physical Exam  Vitals signs and nursing note reviewed.   Constitutional:       General: She is not in acute distress.     Appearance: She is well-developed. She is not diaphoretic.   Eyes:      Pupils: Pupils are equal, round, and reactive to light.   Neck:      Musculoskeletal: Neck supple.      Thyroid: No thyromegaly.   Cardiovascular:      Rate and Rhythm: Normal rate and regular rhythm.   Pulmonary:      Effort: Pulmonary effort is normal. No respiratory distress.      Breath sounds: Normal breath sounds.   Chest:      Chest wall: Tenderness present. No mass or crepitus.   Skin:     General: Skin is warm and dry.      Capillary Refill: Capillary refill takes 2 to 3 seconds.          Neurological:      Mental Status: She is alert and oriented to person, place, and time.   Psychiatric:         Mood and Affect: Mood is anxious.         Behavior: Behavior normal.         Thought Content: Thought content normal.         Judgment: Judgment normal.         Assessment/Plan   Problem List Items Addressed This Visit     None      Visit Diagnoses     Costochondral chest pain    -  Primary    Relevant Medications    ibuprofen (ADVIL,MOTRIN) 800 MG tablet    Furuncle        Relevant Medications    mupirocin (Bactroban) 2 % ointment        Diagnoses and all orders for this visit:    1. Costochondral chest pain (Primary)  -     ibuprofen (ADVIL,MOTRIN) 800 MG tablet; Take 1 tablet by mouth Every 8 (Eight) Hours As Needed for Mild Pain .  Dispense: 45 tablet; Refill: 0  Reassured.  Recommend icing the affected area.  Advised take ibuprofen with food and call or return to clinic with no improvement or new/concerning symptoms  2. Furuncle  -     mupirocin (Bactroban) 2 % ointment; Apply  topically to the appropriate area as directed 3 (Three) Times a Day for 14 " days.  Dispense: 30 g; Refill: 0    Recommend against shaving the affected area.  Recommend exfoliation and warm compresses         The patient was counseled regarding diagnostic results, impressions, prognosis, instructions for management, risk factor reductions, education, and importance of treatment compliance.  The patient verbalized understanding of and agreement with the plan of care.    Advised patient to call with any further questions and any new or worsening symptoms.     Return if symptoms worsen or fail to improve.      Nia Perez, APRN    Please note that portions of this note were completed with a voice recognition program. Efforts were made to edit the dictations, but occasionally words are mistranscribed.

## 2020-12-08 ENCOUNTER — OFFICE VISIT (OUTPATIENT)
Dept: FAMILY MEDICINE CLINIC | Facility: CLINIC | Age: 25
End: 2020-12-08

## 2020-12-08 VITALS
HEIGHT: 67 IN | TEMPERATURE: 97.5 F | DIASTOLIC BLOOD PRESSURE: 76 MMHG | BODY MASS INDEX: 32.65 KG/M2 | HEART RATE: 79 BPM | SYSTOLIC BLOOD PRESSURE: 130 MMHG | WEIGHT: 208 LBS

## 2020-12-08 DIAGNOSIS — F41.1 GAD (GENERALIZED ANXIETY DISORDER): ICD-10-CM

## 2020-12-08 DIAGNOSIS — E66.9 CLASS 1 OBESITY WITH BODY MASS INDEX (BMI) OF 33.0 TO 33.9 IN ADULT, UNSPECIFIED OBESITY TYPE, UNSPECIFIED WHETHER SERIOUS COMORBIDITY PRESENT: ICD-10-CM

## 2020-12-08 DIAGNOSIS — F34.1 DYSTHYMIA: Primary | ICD-10-CM

## 2020-12-08 DIAGNOSIS — F90.2 ATTENTION DEFICIT HYPERACTIVITY DISORDER (ADHD), COMBINED TYPE: ICD-10-CM

## 2020-12-08 PROCEDURE — 99214 OFFICE O/P EST MOD 30 MIN: CPT | Performed by: PHYSICIAN ASSISTANT

## 2020-12-08 RX ORDER — VENLAFAXINE HYDROCHLORIDE 75 MG/1
75 CAPSULE, EXTENDED RELEASE ORAL DAILY
Qty: 30 CAPSULE | Refills: 1 | Status: SHIPPED | OUTPATIENT
Start: 2020-12-08 | End: 2021-01-20 | Stop reason: SDUPTHER

## 2020-12-08 NOTE — PROGRESS NOTES
Subjective   Dana Juarez is a 25 y.o. female  Establish Care (New patient establish care, previous PCP Nia Goodman) and Depression (ongoing issues with increased depression )      History of Present Illness  Patient is a very pleasant 25-year-old white female who presents today as a new patient to our practice to establish care.  Her mother, Deana is a patient in our practice.  Patient comes in today for evaluation and treatment of uncontrolled symptoms of depression, anxiety and difficulty with inability to lose weight and history of weight gain.  Patient states that she was diagnosed with depression, anxiety and ADHD in 2016.  She has been treated with Lexapro, Prozac, Wellbutrin, Lomotil, BuSpar, Adderall and Vyvanse at different times.  Patient experienced significant weight gain with Prozac and did not notice it helping her anxiety or depression.  Lexapro was minimally helpful with anxiety but not depression and had some weight gain.  Wellbutrin gave her blisters, Nelda was started at the same time and it is uncertain which one gave her blisters, BuSpar did help some.  Adderall and Vyvanse both alter ADHD put her in a bad mood and irritable.  She is not on any medication currently.  She states her depression seems worse this year due to the pandemic.  No homicidal or suicidal ideations.  The following portions of the patient's history were reviewed and updated as appropriate: allergies, current medications, past social history and problem list    Review of Systems   Constitutional: Negative for appetite change and fatigue.   Respiratory: Negative.  Negative for chest tightness and shortness of breath.    Cardiovascular: Negative.    Gastrointestinal: Negative for abdominal pain, diarrhea and nausea.   Neurological: Negative for dizziness, tremors, weakness, light-headedness and headaches.   Psychiatric/Behavioral: Positive for decreased concentration and dysphoric mood. Negative for  agitation, behavioral problems, confusion, self-injury, sleep disturbance and suicidal ideas. The patient is nervous/anxious.        Objective     Vitals:    12/08/20 0944   BP: 130/76   Pulse: 79   Temp: 97.5 °F (36.4 °C)     BMI 33.07  Physical Exam  Vitals signs and nursing note reviewed.   Constitutional:       General: She is not in acute distress.     Appearance: Normal appearance. She is well-developed. She is obese. She is not ill-appearing, toxic-appearing or diaphoretic.   Cardiovascular:      Rate and Rhythm: Normal rate and regular rhythm.      Heart sounds: Normal heart sounds.   Pulmonary:      Effort: Pulmonary effort is normal. No respiratory distress.   Skin:     General: Skin is warm and dry.      Coloration: Skin is not pale.   Neurological:      Mental Status: She is alert and oriented to person, place, and time.      Cranial Nerves: No cranial nerve deficit.      Coordination: Coordination normal.   Psychiatric:         Attention and Perception: Attention and perception normal. She is attentive.         Mood and Affect: Affect normal. Mood is anxious.         Speech: Speech normal.         Behavior: Behavior normal. Behavior is cooperative.         Thought Content: Thought content normal.         Cognition and Memory: Cognition and memory normal.         Judgment: Judgment normal.         Assessment/Plan     Diagnoses and all orders for this visit:    1. Dysthymia (Primary)    2. SHAILESH (generalized anxiety disorder)    3. Attention deficit hyperactivity disorder (ADHD), combined type    4. Class 1 obesity with body mass index (BMI) of 33.0 to 33.9 in adult, unspecified obesity type, unspecified whether serious comorbidity present    Other orders  -     venlafaxine XR (Effexor XR) 75 MG 24 hr capsule; Take 1 capsule by mouth Daily. For mood  Dispense: 30 capsule; Refill: 1    Patient to follow-up in 2 weeks for recheck.  We discussed the addition of phentermine at her follow-up appointment if  dysthymia and anxiety are stable.  We will also consider Pristiq and/or Cymbalta as treatment options if any adverse effects noted from Effexor and possibility of adding BuSpar.

## 2020-12-21 ENCOUNTER — OFFICE VISIT (OUTPATIENT)
Dept: FAMILY MEDICINE CLINIC | Facility: CLINIC | Age: 25
End: 2020-12-21

## 2020-12-21 VITALS
HEART RATE: 70 BPM | DIASTOLIC BLOOD PRESSURE: 68 MMHG | TEMPERATURE: 97.5 F | HEIGHT: 67 IN | SYSTOLIC BLOOD PRESSURE: 122 MMHG | WEIGHT: 206 LBS | OXYGEN SATURATION: 99 % | BODY MASS INDEX: 32.33 KG/M2

## 2020-12-21 DIAGNOSIS — L72.3 INFLAMED SEBACEOUS CYST: Primary | ICD-10-CM

## 2020-12-21 DIAGNOSIS — E66.9 CLASS 1 OBESITY WITH BODY MASS INDEX (BMI) OF 33.0 TO 33.9 IN ADULT, UNSPECIFIED OBESITY TYPE, UNSPECIFIED WHETHER SERIOUS COMORBIDITY PRESENT: ICD-10-CM

## 2020-12-21 DIAGNOSIS — F34.1 DYSTHYMIA: ICD-10-CM

## 2020-12-21 DIAGNOSIS — F41.1 GAD (GENERALIZED ANXIETY DISORDER): ICD-10-CM

## 2020-12-21 PROCEDURE — 99214 OFFICE O/P EST MOD 30 MIN: CPT | Performed by: PHYSICIAN ASSISTANT

## 2020-12-21 RX ORDER — MINOCYCLINE HYDROCHLORIDE 100 MG/1
CAPSULE ORAL
Qty: 30 CAPSULE | Refills: 0 | Status: SHIPPED | OUTPATIENT
Start: 2020-12-21 | End: 2022-07-27

## 2020-12-21 NOTE — PROGRESS NOTES
Subjective   Dana Juarez is a 25 y.o. female  Depression (2 week follow up on depression and anxiety, doing well on effexor ) and ingrown hair (ingrown hair in bikini line since Feb )      History of Present Illness  Patient is a pleasant 22-year-old white female who presents today for follow-up of generalized anxiety disorder which is currently improving on Effexor 75 mg daily patient denies ever spectrums medication can see improvement in her overall mood.  She is also following up today on obesity, weight is down 2 pounds the patient continues struggle with difficulty controlling her appetite, BMI 32.75.  She is try to exercise regularly and follow a low calorie low-carb diet struggling with obesity.  She is understanding prescription medication to assist with weight loss.  Reduce use of an inflamed bump on her groin for the past 6 months it comes and goes.  She has not seen a dermatologist.  She has tried topical antibiotics and evaluating.  States it is sore lately  The following portions of the patient's history were reviewed and updated as appropriate: allergies, current medications, past social history and problem list    Review of Systems   Constitutional: Positive for activity change and appetite change. Negative for fatigue, fever and unexpected weight change.   Respiratory: Negative for chest tightness and shortness of breath.    Cardiovascular: Negative for chest pain.   Gastrointestinal: Negative for abdominal distention, abdominal pain, diarrhea and nausea.   Musculoskeletal: Negative for arthralgias.   Skin: Positive for color change and wound. Negative for pallor.   Neurological: Negative for dizziness, tremors, weakness, light-headedness and headaches.   Psychiatric/Behavioral: Positive for dysphoric mood. Negative for agitation, behavioral problems, confusion, decreased concentration, sleep disturbance and suicidal ideas. The patient is nervous/anxious.         Mood improving on Effexor        Objective     Vitals:    12/21/20 1610   BP: 122/68   Pulse: 70   Temp: 97.5 °F (36.4 °C)   SpO2: 99%       Physical Exam  Vitals signs and nursing note reviewed.   Constitutional:       General: She is not in acute distress.     Appearance: Normal appearance. She is well-developed. She is obese. She is not ill-appearing, toxic-appearing or diaphoretic.      Comments: Obesity noted     Neck:      Thyroid: No thyroid mass or thyromegaly.   Cardiovascular:      Rate and Rhythm: Normal rate and regular rhythm.      Heart sounds: Normal heart sounds.   Pulmonary:      Effort: Pulmonary effort is normal.      Breath sounds: Normal breath sounds.   Abdominal:      Palpations: Abdomen is soft.      Tenderness: There is no abdominal tenderness.   Skin:     General: Skin is warm and dry.      Findings: Erythema and lesion present.      Comments: Mildly tender inflamed sebaceous cyst right inguinal region measuring half centimeter in diameter, fluctuant, no drainage   Neurological:      Mental Status: She is alert and oriented to person, place, and time.   Psychiatric:         Attention and Perception: Attention normal. She is attentive.         Mood and Affect: Mood and affect normal. Mood is not anxious or depressed. Affect is not angry or inappropriate.         Speech: Speech normal.         Behavior: Behavior normal.         Thought Content: Thought content normal.         Judgment: Judgment normal.         Assessment/Plan     Diagnoses and all orders for this visit:    1. Inflamed sebaceous cyst (Primary)  -     Ambulatory Referral to Dermatology    2. SHAILESH (generalized anxiety disorder)    3. Dysthymia    4. Class 1 obesity with body mass index (BMI) of 33.0 to 33.9 in adult, unspecified obesity type, unspecified whether serious comorbidity present    Other orders  -     minocycline (Minocin) 100 MG capsule; Take one daily for cyst  Dispense: 30 capsule; Refill: 0    Continue Effexor at current dosage at this time  follow-up in 1 month for recheck of generalized anxiety disorder and dysthymia, prescription for phentermine 30 mg capsules 1 daily for dysthymia and obesity #30 with 1 refill.  States to be his potential and controlled substance as of this medication peer discussed need to continue following a low calorie low-carb high-protein diet with daily exercise follow-up for weight check in 1 month.

## 2020-12-23 ENCOUNTER — TELEPHONE (OUTPATIENT)
Dept: FAMILY MEDICINE CLINIC | Facility: CLINIC | Age: 25
End: 2020-12-23

## 2020-12-23 NOTE — TELEPHONE ENCOUNTER
PATIENT CALLED AND WANTED TO KNOW IF SHE NEEDS TO KEEP TAKING THE ANTIBIOTIC. ( MINOCYCLINE )     PLEASE CALL AND ADVISE AT [phone]

## 2020-12-23 NOTE — TELEPHONE ENCOUNTER
This message makes no sense to me I do not have any information about why she would stop the medication I just prescribed it.

## 2021-01-20 ENCOUNTER — OFFICE VISIT (OUTPATIENT)
Dept: FAMILY MEDICINE CLINIC | Facility: CLINIC | Age: 26
End: 2021-01-20

## 2021-01-20 VITALS
HEART RATE: 64 BPM | BODY MASS INDEX: 32.49 KG/M2 | HEIGHT: 67 IN | DIASTOLIC BLOOD PRESSURE: 68 MMHG | OXYGEN SATURATION: 99 % | WEIGHT: 207 LBS | SYSTOLIC BLOOD PRESSURE: 120 MMHG

## 2021-01-20 DIAGNOSIS — E66.9 CLASS 1 OBESITY WITH BODY MASS INDEX (BMI) OF 33.0 TO 33.9 IN ADULT, UNSPECIFIED OBESITY TYPE, UNSPECIFIED WHETHER SERIOUS COMORBIDITY PRESENT: ICD-10-CM

## 2021-01-20 DIAGNOSIS — F41.1 GAD (GENERALIZED ANXIETY DISORDER): Primary | ICD-10-CM

## 2021-01-20 PROCEDURE — 99214 OFFICE O/P EST MOD 30 MIN: CPT | Performed by: PHYSICIAN ASSISTANT

## 2021-01-20 RX ORDER — PHENTERMINE HYDROCHLORIDE 30 MG/1
CAPSULE ORAL
COMMUNITY
Start: 2020-12-21 | End: 2021-01-20 | Stop reason: DRUGHIGH

## 2021-01-20 RX ORDER — VENLAFAXINE HYDROCHLORIDE 75 MG/1
75 CAPSULE, EXTENDED RELEASE ORAL DAILY
Qty: 30 CAPSULE | Refills: 11 | Status: SHIPPED | OUTPATIENT
Start: 2021-01-20 | End: 2022-02-08

## 2021-01-20 NOTE — PROGRESS NOTES
Subjective   Dana Juarez is a 25 y.o. female  Weight Check (1 month follow up on weight, doesnt feel phentermine is working) and Anxiety (doing well on Effexor, req additional refills)      History of Present Illness  Patient is a pleasant 25-year white female who presents today for evaluation treatment of 2 separate medical conditions 1 stable and uncontrolled.  She is follow-up generalized anxiety disorder which is currently stable on Effexor.  Today she tells medicines considerably anxiety is improved and she denies ever spectrums none.  Second issue is obesity she is currently on phentermine 30 mg capsules and her weight gone up a pound.  She states can tell itself and all does not seem to suppress her appetite.  She is trying to follow a low calorie diet and stay active.  Patient has been obese category with a BMI of 32.  She denies adverse effects of this medication.  The following portions of the patient's history were reviewed and updated as appropriate: allergies, current medications, past social history and problem list    Review of Systems   Constitutional: Negative for activity change, appetite change, fatigue and unexpected weight change.   Respiratory: Negative.  Negative for chest tightness and shortness of breath.    Cardiovascular: Negative for chest pain.   Gastrointestinal: Negative for abdominal distention, abdominal pain, diarrhea and nausea.   Neurological: Negative for dizziness, tremors, weakness, light-headedness and headaches.   Psychiatric/Behavioral: Negative for agitation, behavioral problems, confusion, decreased concentration, dysphoric mood, sleep disturbance and suicidal ideas. The patient is not nervous/anxious.         Anxiety symptoms currently well controlled on medication       Objective     Vitals:    01/20/21 1432   BP: 120/68   Pulse: 64   SpO2: 99%       Physical Exam  Vitals signs and nursing note reviewed.   Constitutional:       General: She is not in acute  distress.     Appearance: Normal appearance. She is well-developed. She is obese. She is not ill-appearing, toxic-appearing or diaphoretic.   HENT:      Head: Normocephalic and atraumatic.   Neck:      Thyroid: No thyroid mass or thyromegaly.   Cardiovascular:      Rate and Rhythm: Normal rate and regular rhythm.      Heart sounds: Normal heart sounds.   Pulmonary:      Effort: Pulmonary effort is normal. No respiratory distress.   Skin:     General: Skin is warm and dry.   Neurological:      Mental Status: She is alert and oriented to person, place, and time.   Psychiatric:         Attention and Perception: Attention and perception normal. She is attentive.         Mood and Affect: Mood and affect normal. Mood is not anxious or depressed. Affect is not angry or inappropriate.         Speech: Speech normal.         Behavior: Behavior normal. Behavior is cooperative.         Thought Content: Thought content normal.         Cognition and Memory: Cognition and memory normal.         Judgment: Judgment normal.         Assessment/Plan     Diagnoses and all orders for this visit:    1. SHAILESH (generalized anxiety disorder) (Primary)    2. Class 1 obesity with body mass index (BMI) of 33.0 to 33.9 in adult, unspecified obesity type, unspecified whether serious comorbidity present    Other orders  -     venlafaxine XR (Effexor XR) 75 MG 24 hr capsule; Take 1 capsule by mouth Daily. For mood  Dispense: 30 capsule; Refill: 11     rx given for phentermine at increased dose of 37.5mg one daily for weight loss BMI 32 #30 RF1, discussed abuse potential and controlled substance status of med and need to follow a low fredis low carb high protein diet with increased water and exercise and fu in one month for recheck.

## 2021-02-23 ENCOUNTER — OFFICE VISIT (OUTPATIENT)
Dept: FAMILY MEDICINE CLINIC | Facility: CLINIC | Age: 26
End: 2021-02-23

## 2021-02-23 VITALS
TEMPERATURE: 97 F | HEART RATE: 79 BPM | DIASTOLIC BLOOD PRESSURE: 68 MMHG | SYSTOLIC BLOOD PRESSURE: 126 MMHG | BODY MASS INDEX: 31.55 KG/M2 | OXYGEN SATURATION: 99 % | HEIGHT: 67 IN | WEIGHT: 201 LBS

## 2021-02-23 DIAGNOSIS — E66.09 CLASS 1 OBESITY DUE TO EXCESS CALORIES WITH BODY MASS INDEX (BMI) OF 31.0 TO 31.9 IN ADULT, UNSPECIFIED WHETHER SERIOUS COMORBIDITY PRESENT: Primary | ICD-10-CM

## 2021-02-23 DIAGNOSIS — F41.1 GAD (GENERALIZED ANXIETY DISORDER): ICD-10-CM

## 2021-02-23 PROCEDURE — 99213 OFFICE O/P EST LOW 20 MIN: CPT | Performed by: PHYSICIAN ASSISTANT

## 2021-02-23 RX ORDER — PHENTERMINE HYDROCHLORIDE 37.5 MG/1
TABLET ORAL
COMMUNITY
Start: 2021-01-21 | End: 2021-05-24 | Stop reason: SDUPTHER

## 2021-02-23 NOTE — PROGRESS NOTES
Subjective   Dana Juarez is a 25 y.o. female  Weight Check (1 month follow up on weight )      History of Present Illness  Patient is a pleasant 45-year-old white female comes in for follow-up of weight loss management association with obesity.  She is doing well weight is down 6 pounds on phentermine daily.  Denies ever spectrums medication she is able to follow a low calorie low-carb high-protein diet with regular exercise getting adequate water intake.  Anxiety well controlled on Effexor at current dosage as well.  The following portions of the patient's history were reviewed and updated as appropriate: allergies, current medications, past social history and problem list    Review of Systems   Constitutional: Positive for appetite change. Negative for activity change, fatigue and unexpected weight change.   Respiratory: Negative for chest tightness and shortness of breath.    Cardiovascular: Negative for chest pain.   Gastrointestinal: Negative for abdominal distention, abdominal pain, diarrhea and nausea.   Neurological: Negative for dizziness, tremors, weakness, light-headedness and headaches.   Psychiatric/Behavioral: Negative for agitation, behavioral problems, confusion, decreased concentration, dysphoric mood, sleep disturbance and suicidal ideas. The patient is not nervous/anxious.        Objective     Vitals:    02/23/21 1430   BP: 126/68   Pulse: 79   Temp: 97 °F (36.1 °C)   SpO2: 99%       Physical Exam  Constitutional:       General: She is not in acute distress.     Appearance: Normal appearance. She is not ill-appearing, toxic-appearing or diaphoretic.   HENT:      Head: Normocephalic and atraumatic.   Skin:     General: Skin is dry.      Coloration: Skin is not jaundiced or pale.      Findings: No bruising, erythema, lesion or rash.   Neurological:      Mental Status: She is alert and oriented to person, place, and time.      Coordination: Coordination normal.   Psychiatric:         Mood and  Affect: Mood normal.         Behavior: Behavior normal.         Thought Content: Thought content normal.         Judgment: Judgment normal.         Assessment/Plan     Diagnoses and all orders for this visit:    1. Class 1 obesity due to excess calories with body mass index (BMI) of 31.0 to 31.9 in adult, unspecified whether serious comorbidity present (Primary)    2. SHAILESH (generalized anxiety disorder)     + Refilled phentermine tablet 1 daily for assistance in weight loss or appetite suppression #30 with 2 refills.  Discussed abuse potential controlled substance status of this medication peer discussed need to follow a low calorie diet with regular exercise and adequate water intake daily, follow-up for recheck of weight and for general medical exam and Pap smear in 3 months

## 2021-03-03 ENCOUNTER — TELEPHONE (OUTPATIENT)
Dept: FAMILY MEDICINE CLINIC | Facility: CLINIC | Age: 26
End: 2021-03-03

## 2021-03-03 NOTE — TELEPHONE ENCOUNTER
Caller: Dana Juarez    Relationship to patient: Self    Best call back number: [phone]     Concerns or Questions if Applicable: PATIENT TESTED POSITIVE FOR COVID AND IS REQUESTING IF HYDROCHLORIQUINE COULD BE CALLED INTO PHARMACY TO HELP TREAT    PATIENT USES [facility] IN [place] ON [address]    Travel screen questions: FEVER CHILLS, COUGH, LOSS OF TASTE AND SMELL, CHEST PAIN AND PRESSURE

## 2021-05-24 ENCOUNTER — OFFICE VISIT (OUTPATIENT)
Dept: FAMILY MEDICINE CLINIC | Facility: CLINIC | Age: 26
End: 2021-05-24

## 2021-05-24 VITALS
WEIGHT: 188.2 LBS | OXYGEN SATURATION: 99 % | DIASTOLIC BLOOD PRESSURE: 66 MMHG | TEMPERATURE: 97.8 F | HEART RATE: 81 BPM | BODY MASS INDEX: 31.36 KG/M2 | RESPIRATION RATE: 15 BRPM | HEIGHT: 65 IN | SYSTOLIC BLOOD PRESSURE: 105 MMHG

## 2021-05-24 DIAGNOSIS — Z00.00 GENERAL MEDICAL EXAM: Primary | ICD-10-CM

## 2021-05-24 DIAGNOSIS — F41.1 GAD (GENERALIZED ANXIETY DISORDER): ICD-10-CM

## 2021-05-24 DIAGNOSIS — E66.09 CLASS 1 OBESITY DUE TO EXCESS CALORIES WITH BODY MASS INDEX (BMI) OF 31.0 TO 31.9 IN ADULT, UNSPECIFIED WHETHER SERIOUS COMORBIDITY PRESENT: ICD-10-CM

## 2021-05-24 DIAGNOSIS — Z11.3 ROUTINE SCREENING FOR STI (SEXUALLY TRANSMITTED INFECTION): ICD-10-CM

## 2021-05-24 DIAGNOSIS — E66.09 CLASS 1 OBESITY DUE TO EXCESS CALORIES WITH BODY MASS INDEX (BMI) OF 30.0 TO 30.9 IN ADULT, UNSPECIFIED WHETHER SERIOUS COMORBIDITY PRESENT: Primary | ICD-10-CM

## 2021-05-24 PROCEDURE — 99395 PREV VISIT EST AGE 18-39: CPT | Performed by: PHYSICIAN ASSISTANT

## 2021-05-24 RX ORDER — ONDANSETRON 4 MG/1
TABLET, ORALLY DISINTEGRATING ORAL
COMMUNITY
Start: 2021-05-12 | End: 2023-02-27

## 2021-05-24 RX ORDER — PHENTERMINE HYDROCHLORIDE 37.5 MG/1
TABLET ORAL
Qty: 30 TABLET | Refills: 3 | Status: SHIPPED | OUTPATIENT
Start: 2021-05-24 | End: 2022-07-27

## 2021-05-24 NOTE — PROGRESS NOTES
Natalie Juarez is a 26 y.o. female  Annual Exam      History of Present Illness  + Patient presents today for a preventive medical visit.  Patient is here to determine screening labs and tests that are due and to determine immunization status as well.  Patient will be counseled regarding preventative medicine issues such as regular exercise and  healthy diet as well.  Patient is due for her Pap smear today.  She is following up on obesity which is currently managed with phentermine for appetite suppression, weight is coming down nicely she states his medications helping considerably with appetite control where she is able to eat a healthy low calorie low-carb high-protein diet making sure to drink any water on a daily basis.  Anxiety is well controlled on Effexor.  Patient received her second Covid vaccine today  The following portions of the patient's history were reviewed and updated as appropriate: allergies, current medications, past social history and problem list    Review of Systems   Constitutional: Negative.    HENT: Negative.    Eyes: Negative.    Respiratory: Negative.    Cardiovascular: Negative.    Gastrointestinal: Negative.    Endocrine: Negative.    Genitourinary: Negative.         Has an IUD   Musculoskeletal: Negative.    Skin: Negative.    Allergic/Immunologic: Negative.    Neurological: Negative.    Hematological: Negative.    Psychiatric/Behavioral: Negative.    All other systems reviewed and are negative.      Objective     Vitals:    05/24/21 1358   BP: 105/66   Pulse: 81   Resp: 15   Temp: 97.8 °F (36.6 °C)   SpO2: 99%       Physical Exam  Vitals and nursing note reviewed.   Constitutional:       General: She is not in acute distress.     Appearance: Normal appearance. She is well-developed. She is not ill-appearing, toxic-appearing or diaphoretic.   HENT:      Head: Normocephalic and atraumatic.      Right Ear: External ear normal.      Left Ear: External ear normal.       Nose: Nose normal.   Eyes:      Conjunctiva/sclera: Conjunctivae normal.      Pupils: Pupils are equal, round, and reactive to light.   Neck:      Thyroid: No thyromegaly.      Vascular: No carotid bruit or JVD.   Cardiovascular:      Rate and Rhythm: Normal rate and regular rhythm.      Heart sounds: Normal heart sounds. No murmur heard.     Pulmonary:      Effort: Pulmonary effort is normal. No respiratory distress.      Breath sounds: Normal breath sounds.   Chest:      Breasts:         Right: Normal. No mass.         Left: Normal. No mass.      Comments: Nipples pierced bilaterally  Abdominal:      General: Bowel sounds are normal.      Palpations: Abdomen is soft. There is no mass.      Tenderness: There is no abdominal tenderness.   Genitourinary:     Vagina: No vaginal discharge.      Comments: Pap smear done cervix clear IUD string in place  Musculoskeletal:         General: Normal range of motion.      Cervical back: Normal range of motion and neck supple.   Lymphadenopathy:      Cervical: No cervical adenopathy.      Upper Body:      Right upper body: No axillary adenopathy.      Left upper body: No axillary adenopathy.   Skin:     General: Skin is warm and dry.   Neurological:      Mental Status: She is alert and oriented to person, place, and time.      Cranial Nerves: No cranial nerve deficit.      Coordination: Coordination normal.      Deep Tendon Reflexes: Reflexes are normal and symmetric.   Psychiatric:         Mood and Affect: Mood normal.         Behavior: Behavior normal.         Thought Content: Thought content normal.         Judgment: Judgment normal.       Discussed preventative medicine issues with patient including regular exercise, healthy diet, stress reduction, adequate sleep and recommended age-appropriate screening studies.  Assessment/Plan     Diagnoses and all orders for this visit:    1. General medical exam (Primary)  -     HIV-1 / O / 2 Ag / Antibody 4th Generation; Future    2.  Class 1 obesity due to excess calories with body mass index (BMI) of 31.0 to 31.9 in adult, unspecified whether serious comorbidity present    3. SHAILESH (generalized anxiety disorder)    4. Routine screening for STI (sexually transmitted infection)  -     HIV-1 / O / 2 Ag / Antibody 4th Generation; Future    Will send 4-month refill of phentermine 37.5 mg tablets 1 daily for weight loss #30 with 3 refills, encourage patient continue following a healthy low calorie low-carb high-protein diet with adequate fiber intake and water intake, encouraged regular exercise, follow-up in 3 months for recheck of weight.  Discussed abuse potential and controlled substance as of this medication.  Will send copy of Pap smear results when I receive them.  We will add on STI testing to Pap smear.

## 2021-08-09 ENCOUNTER — TELEPHONE (OUTPATIENT)
Dept: FAMILY MEDICINE CLINIC | Facility: CLINIC | Age: 26
End: 2021-08-09

## 2021-08-09 NOTE — TELEPHONE ENCOUNTER
Per Marisol Pierce- No. Patient needs to schedule an appt for next availability or go back to UC for evaluation.

## 2021-08-09 NOTE — TELEPHONE ENCOUNTER
PATIENT SAID THAT SHE HAS A UTI AND NEEDS TO BE SEEN TODAY. HER MOM HAS APPOINTMENT AT 4 AND SAID SHE CAN COME IN WITH HER. HER MOM TOLD HER THAT [facility]NAIS SAID THAT THIS WOULD BE OK TO DI ANYTIME THAT SHE NEEDS TO BE SEEN, JUST TO COME WITH MOM AND SHE WOULD SEE THEM BOTH AT THE SAME TIME.    SHE HAS BEEN TO THE [facility] OVER THE LAST SEVERAL WEEKS TO BE TESTED FOR STD & YEAST INFECTION. STATES THAT SHE IS HAVING ITCHING, BURNING, PAIN AND FREQUENCY.

## 2021-08-17 ENCOUNTER — OFFICE VISIT (OUTPATIENT)
Dept: FAMILY MEDICINE CLINIC | Facility: CLINIC | Age: 26
End: 2021-08-17

## 2021-08-17 VITALS
OXYGEN SATURATION: 99 % | DIASTOLIC BLOOD PRESSURE: 70 MMHG | BODY MASS INDEX: 28.66 KG/M2 | WEIGHT: 172 LBS | TEMPERATURE: 97.2 F | HEIGHT: 65 IN | SYSTOLIC BLOOD PRESSURE: 122 MMHG | HEART RATE: 84 BPM

## 2021-08-17 DIAGNOSIS — E66.3 OVERWEIGHT (BMI 25.0-29.9): ICD-10-CM

## 2021-08-17 DIAGNOSIS — R30.0 BURNING WITH URINATION: Primary | ICD-10-CM

## 2021-08-17 DIAGNOSIS — N76.1 CHRONIC VAGINITIS: ICD-10-CM

## 2021-08-17 LAB
BILIRUB BLD-MCNC: NEGATIVE MG/DL
CLARITY, POC: CLEAR
COLOR UR: YELLOW
GLUCOSE UR STRIP-MCNC: NEGATIVE MG/DL
KETONES UR QL: ABNORMAL
LEUKOCYTE EST, POC: NEGATIVE
NITRITE UR-MCNC: NEGATIVE MG/ML
PH UR: 6 [PH] (ref 5–8)
PROT UR STRIP-MCNC: ABNORMAL MG/DL
RBC # UR STRIP: NEGATIVE /UL
SP GR UR: 1.02 (ref 1–1.03)
UROBILINOGEN UR QL: NORMAL

## 2021-08-17 PROCEDURE — 81003 URINALYSIS AUTO W/O SCOPE: CPT | Performed by: PHYSICIAN ASSISTANT

## 2021-08-17 PROCEDURE — 99214 OFFICE O/P EST MOD 30 MIN: CPT | Performed by: PHYSICIAN ASSISTANT

## 2021-08-17 NOTE — PROGRESS NOTES
Subjective   Dana Juarez is a 26 y.o. female  Weight Check (follow up on weight, refill on phentrermine ) and BV (ongoing issues with BV, burning sensation and vaginal discharge, STD completed and normal at )      History of Present Illness  Patient comes in today for evaluation treatment 3 separate medical conditions she is here for follow-up on weight loss management in association with history of obesity now overweight BMI 28.62.  She has done exceptionally well on phentermine and is continued on his medication to be very helpful to control her appetite so she is able to follow a low calorie high-protein diet.  Denies any adverse effects of medication.  Due for refills.  Second issue is of recurrent problems with intermittent vaginal odor and at times burning with urination.  She had a Pap smear with us in May which was normal did STD testing the time which was normal she had STD testing done twice and sent to different facilities which was normal, she has tried over-the-counter Monistat which made symptoms worse, has been prescribed Diflucan, MetroGel vaginal gel and oral Flagyl and continues to have intermittent symptoms although asymptomatic today.  She does have an IUD and is concerned about this could be adding to her symptoms.  The following portions of the patient's history were reviewed and updated as appropriate: allergies, current medications, past social history and problem list    Review of Systems   Constitutional: Positive for activity change and appetite change. Negative for unexpected weight change.   Respiratory: Negative.    Cardiovascular: Negative.  Negative for chest pain.   Gastrointestinal: Negative for abdominal distention, abdominal pain, diarrhea and nausea.   Genitourinary: Positive for dysuria and vaginal pain ( itching at times).   Psychiatric/Behavioral: Negative for dysphoric mood. The patient is not nervous/anxious.        Objective     Vitals:    08/17/21 1524   BP: 122/70    Pulse: 84   Temp: 97.2 °F (36.2 °C)   SpO2: 99%       Physical Exam  Vitals and nursing note reviewed.   Constitutional:       General: She is not in acute distress.     Appearance: Normal appearance. She is well-developed. She is not ill-appearing, toxic-appearing or diaphoretic.      Comments: Body habitus overweight but no longer obese.     Neck:      Thyroid: No thyromegaly.   Cardiovascular:      Rate and Rhythm: Normal rate and regular rhythm.   Pulmonary:      Effort: Pulmonary effort is normal. No respiratory distress.      Breath sounds: Normal breath sounds.   Abdominal:      Palpations: Abdomen is soft.      Tenderness: There is no abdominal tenderness.   Genitourinary:     General: Normal vulva.      Vagina: No vaginal discharge.   Neurological:      Mental Status: She is alert and oriented to person, place, and time.   Psychiatric:         Mood and Affect: Mood normal.         Behavior: Behavior normal.         Thought Content: Thought content normal.         Judgment: Judgment normal.     UA normal other than trace protein and small ketones discussed results with patient.    Assessment/Plan     Diagnoses and all orders for this visit:    1. Burning with urination (Primary)  -     POC Urinalysis Dipstick, Automated    2. Chronic vaginitis  -     Ambulatory Referral to Obstetrics / Gynecology    3. Overweight (BMI 25.0-29.9)    Refill phentermine 37.5 mg tablets 1 daily for weight loss #30 with 3 refills.  Encourage patient to continue with low calorie low-carb high-protein diet adequate water intake daily and daily exercise.  Follow-up in 3 months for recheck of weight.    As part of this patient's treatment plan, patient will be prescribed controlled substances. The patient has been made aware of appropriate use of such medications, including potential risk of somnolence, limited ability to drive and /or work safely, and potential for dependence or overdose. It has also been made clear that these  medications are for use by this patient only, without concomitant use of alcohol or other substances unless prescribed.Controlled substance status of medication discussed with patient, discussed risks of medication including abuse potential and diversion potential and need to follow up for reevaluation appointment in order to receive further refills.    Please note that portions of this document were completed with a voice recognition program. Efforts were made to edit the dictations, but occasionally words are mis-transcribed

## 2021-08-26 ENCOUNTER — TELEPHONE (OUTPATIENT)
Dept: FAMILY MEDICINE CLINIC | Facility: CLINIC | Age: 26
End: 2021-08-26

## 2021-08-26 NOTE — TELEPHONE ENCOUNTER
Telephone encounter to be sent to the clinical pool     Caller: Dana Juarez    Relationship: Self    Best call back number: [phone]    What is the medical concern/diagnosis: NA    What specialty or service is being requested: NEW GYNECOLOGY REFERRAL    What is the provider, practice or medical service name: NA    What is the office location: NA    What is the office phone number: NA    Any additional details:     PATIENT STATED THE GYNECOLOGY OFFICE SHE WAS REFERRED TO CANNOT GET HER SCHEDULED UNTIL November.      PATIENT WOULD LIKE TO KNOW IF THERE IS ANOTHER GYNECOLOGY OFFICE SHE CAN BE REFERRED TO THAT CAN SCHEDULE HER AS SOON AS POSSIBLE.         “ Thank you for sharing this information. I will send a message to the clinical team. Please allow 48 hours for the clinical staff to follow up on this request.”

## 2021-09-07 ENCOUNTER — OFFICE VISIT (OUTPATIENT)
Dept: OBSTETRICS AND GYNECOLOGY | Facility: CLINIC | Age: 26
End: 2021-09-07

## 2021-09-07 VITALS — DIASTOLIC BLOOD PRESSURE: 66 MMHG | WEIGHT: 167.2 LBS | BODY MASS INDEX: 27.82 KG/M2 | SYSTOLIC BLOOD PRESSURE: 112 MMHG

## 2021-09-07 DIAGNOSIS — N89.8 VAGINAL IRRITATION: Primary | ICD-10-CM

## 2021-09-07 PROCEDURE — 99213 OFFICE O/P EST LOW 20 MIN: CPT | Performed by: NURSE PRACTITIONER

## 2021-09-07 NOTE — PROGRESS NOTES
Chief Complaint   Patient presents with   • Vaginitis         Subjective   HPI  Dana Juarez is a 26 y.o. female, , who presents with evaluation of vaginal and vulvar itching, redness and irritation that is persistent.      She reports that issues first began in the mid to end of July.  She says that when she called for an appointment, there was nothing available and she was advised to go to urgent care.  She was seen at the [facility], and was told the did not have the option to test for anything other than STDs.  They did an STD culture and treated for BV, as that is what they thought it was.  STD culture was lost, and symptoms did not improve.  She says that she then went to an urgent treatment center, where they repeated an STD culture, and tested for BV and yeast.  STD panel was negative, both BV and yeast were positive.  She was treated with Diflucan and flagyl cream and oral and then a second round of diflucan and symptoms continue to persist.      She reports that since her STD screening with [facility], she has had one new sexual partner, and three total since her annual exam 2021.  She reports that did recently change laundry detergents mid August, after symptoms began.       Her last LMP was No LMP recorded (lmp unknown). (Menstrual status: Other)..  Periods are rare, secondary to IUD. Partner Status: Marital Status: single.  New Partners since last visit: yes.  Desires STD Screening: yes.    Additional OB/GYN History   Last Pap :   Last Completed Pap Smear          PAP SMEAR (Every 3 Years) Next due on 2021  SCANNED - PAP SMEAR    2019  Done              History of abnormal Pap smear: no  OB History        0    Para   0    Term   0       0    AB   0    Living   0       SAB   0    TAB   0    Ectopic   0    Molar   0    Multiple   0    Live Births   0                The additional following portions of the patient's history were reviewed and updated as  appropriate: allergies, current medications, past family history, past medical history, past social history, past surgical history and problem list.    Review of Systems   Constitutional: Negative.    HENT: Negative.    Respiratory: Negative.    Cardiovascular: Negative.    Gastrointestinal: Negative.    Genitourinary:        Vulvar irritation, redness, dryness   Musculoskeletal: Negative.    Skin: Negative.    Allergic/Immunologic: Negative.    Neurological: Negative.    Hematological: Negative.    Psychiatric/Behavioral: Negative.      All other systems reviewed and are negative.     I have reviewed and agree with the HPI, ROS, and historical information as entered above. Caro Gloria, APRN    Objective   /66   Wt 75.8 kg (167 lb 3.2 oz)   LMP  (LMP Unknown)   Breastfeeding No   BMI 27.82 kg/m²     Physical Exam  Vitals and nursing note reviewed. Exam conducted with a chaperone present.   Constitutional:       Appearance: Normal appearance.   HENT:      Head: Normocephalic and atraumatic.   Pulmonary:      Effort: Pulmonary effort is normal.   Genitourinary:     Labia:         Right: No rash, tenderness or lesion.         Left: No rash, tenderness or lesion.       Vagina: Normal. No lesions.      Cervix: No cervical motion tenderness, discharge, lesion or cervical bleeding.      Uterus: Normal. Not enlarged, not fixed and not tender.       Adnexa:         Right: No mass or tenderness.          Left: No mass or tenderness.        Rectum: No external hemorrhoid.      Comments: Chaperone Present  Neurological:      Mental Status: She is alert and oriented to person, place, and time.   Psychiatric:         Behavior: Behavior normal.     IUD string present    Wet mount performed? Yes. Pertinent positives include: inconclusive    Assessment/Plan     Assessment and Plan    Problem List Items Addressed This Visit     None      Visit Diagnoses     Vaginal irritation    -  Primary    Relevant Orders    [facility] [[email or web address] VG+ -  Swab, Cervix    Genital Mycoplasmas JONN, Swab - Swab, Cervix          1. Wet prep inclusive, Nuswab sent. Will review and treat if indicated. Given a sample of Epicerum to apply to perineum where she feels dry. Pt to call if worsens.         Caro Gloria, APRN  09/07/2021

## 2021-09-12 LAB
A VAGINAE DNA VAG QL NAA+PROBE: NORMAL SCORE
BVAB2 DNA VAG QL NAA+PROBE: NORMAL SCORE
C ALBICANS DNA VAG QL NAA+PROBE: NEGATIVE
C GLABRATA DNA VAG QL NAA+PROBE: NEGATIVE
C TRACH DNA VAG QL NAA+PROBE: NEGATIVE
M GENITALIUM DNA SPEC QL NAA+PROBE: NEGATIVE
M HOMINIS DNA SPEC QL NAA+PROBE: POSITIVE
MEGA1 DNA VAG QL NAA+PROBE: NORMAL SCORE
N GONORRHOEA DNA VAG QL NAA+PROBE: NEGATIVE
T VAGINALIS DNA VAG QL NAA+PROBE: NEGATIVE
UREAPLASMA DNA SPEC QL NAA+PROBE: POSITIVE

## 2021-09-15 RX ORDER — DOXYCYCLINE 100 MG/1
100 CAPSULE ORAL 2 TIMES DAILY
Qty: 14 CAPSULE | Refills: 0 | Status: SHIPPED | OUTPATIENT
Start: 2021-09-15 | End: 2021-09-22

## 2021-09-19 RX ORDER — AZITHROMYCIN 250 MG/1
TABLET, FILM COATED ORAL
Qty: 6 TABLET | Refills: 0 | Status: SHIPPED | OUTPATIENT
Start: 2021-09-19 | End: 2022-07-27

## 2021-09-20 RX ORDER — FLUCONAZOLE 150 MG/1
TABLET ORAL
Qty: 2 TABLET | Refills: 0 | Status: SHIPPED | OUTPATIENT
Start: 2021-09-20 | End: 2022-07-27

## 2021-09-21 ENCOUNTER — OFFICE VISIT (OUTPATIENT)
Dept: OBSTETRICS AND GYNECOLOGY | Facility: CLINIC | Age: 26
End: 2021-09-21

## 2021-09-21 VITALS — SYSTOLIC BLOOD PRESSURE: 102 MMHG | BODY MASS INDEX: 28.56 KG/M2 | DIASTOLIC BLOOD PRESSURE: 60 MMHG | WEIGHT: 171.6 LBS

## 2021-09-21 DIAGNOSIS — B37.9 YEAST INFECTION: Primary | ICD-10-CM

## 2021-09-21 DIAGNOSIS — N90.89 VULVAR IRRITATION: ICD-10-CM

## 2021-09-21 DIAGNOSIS — N90.89 VULVAR LESION: ICD-10-CM

## 2021-09-21 DIAGNOSIS — N89.8 VAGINAL ITCHING: ICD-10-CM

## 2021-09-21 DIAGNOSIS — Z30.432 ENCOUNTER FOR IUD REMOVAL: ICD-10-CM

## 2021-09-21 LAB
KOH PREP NAIL: ABNORMAL
WET PREP GENITAL: NORMAL

## 2021-09-21 PROCEDURE — 58301 REMOVE INTRAUTERINE DEVICE: CPT | Performed by: NURSE PRACTITIONER

## 2021-09-21 PROCEDURE — 87210 SMEAR WET MOUNT SALINE/INK: CPT | Performed by: NURSE PRACTITIONER

## 2021-09-21 PROCEDURE — 99213 OFFICE O/P EST LOW 20 MIN: CPT | Performed by: NURSE PRACTITIONER

## 2021-09-21 PROCEDURE — 87220 TISSUE EXAM FOR FUNGI: CPT | Performed by: NURSE PRACTITIONER

## 2021-09-21 RX ORDER — NYSTATIN AND TRIAMCINOLONE ACETONIDE 100000; 1 [USP'U]/G; MG/G
1 OINTMENT TOPICAL 2 TIMES DAILY
Qty: 15 G | Refills: 0 | Status: SHIPPED | OUTPATIENT
Start: 2021-09-21 | End: 2021-09-28

## 2021-09-21 NOTE — PROGRESS NOTES
Chief Complaint   Patient presents with   • IUD removal   • Vaginitis     worsening         Subjective   HPI  Dana Juarez is a 26 y.o. female, , who presents with evaluation of worsening vaginal discharge, itching and pain.  She would also like to have her Kyleena removed.  She does not desire to start any other form of contraception at this time.     She reports that she is still currently taking azithromycin, but symptoms are not improving.  Pt was seen recently and had cultures done that were negative for yeast, BV, and STDs. It was positive for ureaplasma and mycoplasma. She was started on doxy, but was unable to finish due to side effects. She called and was switched to Azith. Prior to that, she was treated for BV by her PCP.     Her last LMP was No LMP recorded (lmp unknown). (Menstrual status: Other)..  Periods are rare, secondary to Kyleena. l New Products: No Partner Status: Marital Status: single. New Partners since last visit: no.  Desires STD Screening: no.     Additional OB/GYN History   Last Pap :   Last Completed Pap Smear          PAP SMEAR (Every 3 Years) Next due on 2021  SCANNED - PAP SMEAR    2019  Done                  The additional following portions of the patient's history were reviewed and updated as appropriate: allergies, current medications, past family history, past medical history, past social history, past surgical history and problem list.    Review of Systems   Genitourinary:        Itching, discharge, pain   All other systems reviewed and are negative.    All other systems reviewed and are negative.     I have reviewed and agree with the HPI, ROS, and historical information as entered above. Caro Gloria, APRN    Objective   /60   Wt 77.8 kg (171 lb 9.6 oz)   LMP  (LMP Unknown)   Breastfeeding No   BMI 28.56 kg/m²     Physical Exam  Vitals and nursing note reviewed. Exam conducted with a chaperone present.   Constitutional:        Appearance: Normal appearance.   HENT:      Head: Normocephalic and atraumatic.   Pulmonary:      Effort: Pulmonary effort is normal.   Abdominal:      General: Abdomen is flat.      Palpations: Abdomen is soft.   Genitourinary:     Labia:         Right: No rash, tenderness or lesion.         Left: No rash, tenderness or lesion.       Vagina: Vaginal discharge present. No lesions.      Cervix: Discharge (clumpy, white, thick) present. No cervical motion tenderness, lesion or cervical bleeding.      Uterus: Normal. Not enlarged, not fixed and not tender.       Adnexa:         Right: No mass or tenderness.          Left: No mass or tenderness.        Rectum: No external hemorrhoid.      Comments: Chaperone Present. Multiple linear excoriations along perineum.   Neurological:      Mental Status: She is alert and oriented to person, place, and time.   Psychiatric:         Behavior: Behavior normal.         Wet mount performed? Yes. Pertinent positives include: Yeast Buds    Assessment/Plan     Assessment and Plan    Problem List Items Addressed This Visit     None      Visit Diagnoses     Yeast infection    -  Primary    Vulvar lesion        Relevant Orders    HSV 1/2, PCR (Non-CSF) - , Vulva    Vulvar irritation        Relevant Orders    POC KOH Prep (Completed)    POC Wet Prep (Completed)    Vaginal itching        Relevant Medications    nystatin-triamcinolone (MYCOLOG) 617064-1.1 UNIT/GM-% ointment    Other Relevant Orders    POC KOH Prep (Completed)    POC Wet Prep (Completed)    Encounter for IUD removal              1. Wet prep +yeast, Rx diflucan and nystatin-triamcinolone cream. Yeast likely due to recent antibiotic use. Excoriations cultured for HSV, however suspicion low. Advised to call if symptoms persist despite treatment.     IUD Removal Procedure Note    Procedures    Type of IUD:  Kyleena   Date of insertion:  Known- Kyleena 2/5/2020 JTA  Reason for removal:  Side effect: recurrent vaginitis    Procedure  Time Out Documentation    Procedure Details  IUD strings visible:  yes  Removal:  IUD strings grasped and IUD removed intact with gentle traction.  The patient tolerated the procedure well.    All appropriate instructions regarding removal were reviewed.    Tolerated well  No apparent complications  Post procedure diagnosis : IUD removal     Plans for contraception:  condoms    The patient was advised to call for any fever or for prolonged or severe pain or bleeding. She was advised to use motrin as needed for mild to moderate pain.     Caro Gloria, CHRISTINA  09/21/2021

## 2021-09-25 LAB
HSV1 DNA SPEC QL NAA+PROBE: NEGATIVE
HSV2 DNA SPEC QL NAA+PROBE: NEGATIVE

## 2021-10-18 ENCOUNTER — TELEPHONE (OUTPATIENT)
Dept: FAMILY MEDICINE CLINIC | Facility: CLINIC | Age: 26
End: 2021-10-18

## 2021-10-18 NOTE — TELEPHONE ENCOUNTER
----- Message from Dana Juarez sent at 10/16/2021  9:22 PM EDT -----  Regarding: Non-Urgent Medical Question  Contact: [phone]  Hey! Of course something new has popped up! I got this rash three days ago and i put some rash cream on and kept it clean and then tried no rash cream and it has slowly gotten worse over three days all over my neck and I have I have no idea what it could be. I haven't worn any weird perfume, necklaces, clothes and no new detergent, soap, etc.   Best,  Elena

## 2022-02-08 RX ORDER — VENLAFAXINE HYDROCHLORIDE 75 MG/1
CAPSULE, EXTENDED RELEASE ORAL
Qty: 30 CAPSULE | Refills: 5 | Status: SHIPPED | OUTPATIENT
Start: 2022-02-08 | End: 2022-07-27 | Stop reason: SDUPTHER

## 2022-02-14 DIAGNOSIS — E66.09 CLASS 1 OBESITY DUE TO EXCESS CALORIES WITH BODY MASS INDEX (BMI) OF 30.0 TO 30.9 IN ADULT, UNSPECIFIED WHETHER SERIOUS COMORBIDITY PRESENT: ICD-10-CM

## 2022-02-15 RX ORDER — PHENTERMINE HYDROCHLORIDE 37.5 MG/1
TABLET ORAL
Qty: 30 TABLET | OUTPATIENT
Start: 2022-02-15

## 2022-07-27 ENCOUNTER — OFFICE VISIT (OUTPATIENT)
Dept: FAMILY MEDICINE CLINIC | Facility: CLINIC | Age: 27
End: 2022-07-27

## 2022-07-27 ENCOUNTER — TELEPHONE (OUTPATIENT)
Dept: FAMILY MEDICINE CLINIC | Facility: CLINIC | Age: 27
End: 2022-07-27

## 2022-07-27 VITALS
TEMPERATURE: 97.2 F | BODY MASS INDEX: 32.65 KG/M2 | WEIGHT: 196 LBS | OXYGEN SATURATION: 99 % | SYSTOLIC BLOOD PRESSURE: 114 MMHG | DIASTOLIC BLOOD PRESSURE: 66 MMHG | HEART RATE: 78 BPM | HEIGHT: 65 IN

## 2022-07-27 DIAGNOSIS — Z00.00 GENERAL MEDICAL EXAM: Primary | ICD-10-CM

## 2022-07-27 DIAGNOSIS — E66.09 CLASS 1 OBESITY DUE TO EXCESS CALORIES WITH BODY MASS INDEX (BMI) OF 30.0 TO 30.9 IN ADULT, UNSPECIFIED WHETHER SERIOUS COMORBIDITY PRESENT: ICD-10-CM

## 2022-07-27 DIAGNOSIS — F41.1 GAD (GENERALIZED ANXIETY DISORDER): ICD-10-CM

## 2022-07-27 PROCEDURE — 99395 PREV VISIT EST AGE 18-39: CPT | Performed by: PHYSICIAN ASSISTANT

## 2022-07-27 RX ORDER — CYANOCOBALAMIN 1000 UG/ML
1000 INJECTION, SOLUTION INTRAMUSCULAR; SUBCUTANEOUS ONCE
Status: DISCONTINUED | OUTPATIENT
Start: 2022-07-27 | End: 2022-07-27

## 2022-07-27 RX ORDER — VENLAFAXINE HYDROCHLORIDE 75 MG/1
75 CAPSULE, EXTENDED RELEASE ORAL DAILY
Qty: 30 CAPSULE | Refills: 11 | Status: SHIPPED | OUTPATIENT
Start: 2022-07-27 | End: 2022-09-06 | Stop reason: SDUPTHER

## 2022-07-27 NOTE — PROGRESS NOTES
"Natalie Juarez is a 27 y.o. female  Annual Exam (Annual physical ) and Gynecologic Exam (Pap smear )      History of Present Illness  Patient presents today for a preventive medical visit.  Patient is here to determine screening labs and tests that are due and to determine immunization status as well.  Patient will be counseled regarding preventative medicine issues such as regular exercise and healthy diet as well.  The following portions of the patient's history were reviewed and updated as appropriate: allergies, current medications, past social history and problem list.      The patient is a 27-year-old female seen today for an annual physical with a repeat Pap smear as well as follow-up on generalized anxiety disorder.    The patient reports that she has been doing well. She reports having a busy summer working. She will continue to work in the fall. She denies having any big social changes in the future. She reports previously taking phentermine and having no pain. She then reports having severe abdominal pain/cramps suddenly and again after taking phentermine. She reports after changing pharmacies that the phentermine appeared the same and possibly had no change in the  or brand. She reports increased symptoms of anxiety after discontinuing the phentermine. She reports previously being diagnosed with ADD. She reports medication effectiveness while taking Effexor for depression. She reports starting with the phentermine capsule with no relief and then switching to the tablet with effectiveness. She reports taking the phentermine tablet for several months before having adverse effects. She reports during this time switching to [email or web address] pharmacy and may have received a different brand. The patient denies knowing when she started receiving her medication from [email or web address]. The patient reports previously taking Vyvanse and Adderall XR and \"really liked\" the medication. She reports good effectiveness " "the first 4 hours of Adderall XR and then having no relief, \"hated\" the next 4 hours. She reports starting Adderall at possibly 10 mg and then increasing to 15 mg and 20 mg. She is due for a Pap smear. She denies \"looking\" at the results from the previous Pap smear while in the office/MyChart. She was told that the Pap smear was irregular and now she is concerned. Her previous Pap smear result was normal with no cancerous or precancerous cells and negative for HPV types. She is positive for the HPV variant. She reports removing the IUD and then the multiple rashes cleared except for 1 cyst. She reports seeing the dermatologist for the 1 cyst and being diagnosed with Hidradenitis suppurativa. She reports that it may be cystic acne or due to hormones. Her menstrual cycles are regular and normal, 1 time monthly. She denies using birth control. She reports previously trying many different types of birth control medications but had side effects of multiple rashes and feeling manic. She refuses the birth control implant and the ParaGuard IUD. She tried the birth control patch but reports mental issues while taking the medication. She denies trying the birth control ring because it is very similar to the patch. She denies being aware of  having a history of ovarian cyst.    Review of Systems   Constitutional: Negative.  Negative for appetite change and fatigue.   HENT: Negative.    Eyes: Negative.    Respiratory: Negative.  Negative for chest tightness and shortness of breath.    Cardiovascular: Negative.    Gastrointestinal: Negative.  Negative for abdominal pain, diarrhea and nausea.   Endocrine: Negative.    Genitourinary: Negative.  Negative for menstrual problem.   Musculoskeletal: Negative.    Skin: Negative.    Allergic/Immunologic: Negative.    Neurological: Negative.  Negative for dizziness, tremors, weakness, light-headedness and headaches.   Hematological: Negative.    Psychiatric/Behavioral: Positive for " decreased concentration. Negative for agitation, behavioral problems, confusion, dysphoric mood, hallucinations, self-injury, sleep disturbance and suicidal ideas. The patient is nervous/anxious. The patient is not hyperactive.    All other systems reviewed and are negative.      Objective     Vitals:    07/27/22 1406   BP: 114/66   Pulse: 78   Temp: 97.2 °F (36.2 °C)   SpO2: 99%       Physical Exam  Vitals and nursing note reviewed.   Constitutional:       General: She is not in acute distress.     Appearance: Normal appearance. She is well-developed. She is not ill-appearing, toxic-appearing or diaphoretic.      Comments: BMI 32   HENT:      Head: Normocephalic and atraumatic.      Right Ear: External ear normal.      Left Ear: External ear normal.   Eyes:      Conjunctiva/sclera: Conjunctivae normal.      Pupils: Pupils are equal, round, and reactive to light.   Neck:      Thyroid: No thyromegaly.      Vascular: No carotid bruit or JVD.   Cardiovascular:      Rate and Rhythm: Normal rate and regular rhythm.      Pulses: Normal pulses.      Heart sounds: Normal heart sounds. No murmur heard.  Pulmonary:      Effort: Pulmonary effort is normal. No respiratory distress.      Breath sounds: Normal breath sounds.   Abdominal:      General: Bowel sounds are normal.      Palpations: Abdomen is soft. There is no mass.      Tenderness: There is no abdominal tenderness.   Genitourinary:     General: Normal vulva.      Vagina: No vaginal discharge.      Comments: Cervix clear, pap done  Musculoskeletal:         General: No swelling. Normal range of motion.      Cervical back: Normal range of motion and neck supple.   Lymphadenopathy:      Cervical: No cervical adenopathy.   Skin:     General: Skin is warm and dry.      Findings: No lesion or rash.   Neurological:      Mental Status: She is alert and oriented to person, place, and time.      Cranial Nerves: No cranial nerve deficit.      Sensory: No sensory deficit.       Motor: No weakness.      Coordination: Coordination normal.      Gait: Gait normal.      Deep Tendon Reflexes: Reflexes are normal and symmetric.   Psychiatric:         Mood and Affect: Mood normal.         Behavior: Behavior normal.         Thought Content: Thought content normal.         Judgment: Judgment normal.       Discussed preventative medicine issues with patient including regular exercise, healthy diet, stress reduction, adequate sleep and recommended age-appropriate screening studies.  Assessment & Plan     Diagnoses and all orders for this visit:    1. General medical exam (Primary)    2. SHAILESH (generalized anxiety disorder)    3. Class 1 obesity due to excess calories with body mass index (BMI) of 30.0 to 30.9 in adult, unspecified whether serious comorbidity present    Other orders  -     Discontinue: cyanocobalamin injection 1,000 mcg  -     venlafaxine XR (EFFEXOR-XR) 75 MG 24 hr capsule; Take 1 capsule by mouth Daily.  Dispense: 30 capsule; Refill: 11     1. History of ADD and anxiety  - We recommend that patient check with [facility] and ask for the 's name for the phentermine. She should also check with [facility] pharmacy to compare the 's name for the phentermine due to current adverse effects. She will call to request the phentermine.   - We will refill the venlafaxine, 1 year of medication.     2. Pap smear  - She completed a Pap smear, today. If her results reveal positive for HPV variant again, then she will be referred to a gynecologist to look at her cervix with a special light.     - She will do a repeat Pap smear in 1 year.  - The patient will call within 2 weeks if she does not receive a letter.     Transcribed from ambient dictation for Marisol Pierce PA-C by Ila BOWDEN.  07/27/22   16:38 EDT    Patient verbalized consent to the visit recording.  I have personally performed the services described in this document as transcribed by the above individual, and it is  both accurate and complete.  Marisol Pierce PA-C  7/27/2022  16:56 EDT

## 2022-07-27 NOTE — TELEPHONE ENCOUNTER
Provider: ALEXY CAMACHO    Caller: MIRA اللعي    Relationship to Patient: SELF    Phone Number: [phone]    Reason for Call: PATIENT CALLED BACK IN AND WANTED [employer] WAS THE  OF THE PHENTERMINE AT BOTH [facility] AND [facility].

## 2022-07-28 DIAGNOSIS — E66.9 CLASS 1 OBESITY WITHOUT SERIOUS COMORBIDITY WITH BODY MASS INDEX (BMI) OF 33.0 TO 33.9 IN ADULT, UNSPECIFIED OBESITY TYPE: Primary | ICD-10-CM

## 2022-07-28 RX ORDER — PHENTERMINE HYDROCHLORIDE 37.5 MG/1
37.5 TABLET ORAL
Qty: 30 TABLET | Refills: 3 | Status: SHIPPED | OUTPATIENT
Start: 2022-07-28 | End: 2023-02-27 | Stop reason: SINTOL

## 2022-09-06 RX ORDER — VENLAFAXINE HYDROCHLORIDE 75 MG/1
75 CAPSULE, EXTENDED RELEASE ORAL DAILY
Qty: 30 CAPSULE | Refills: 11 | Status: SHIPPED | OUTPATIENT
Start: 2022-09-06 | End: 2022-11-04

## 2022-09-06 NOTE — TELEPHONE ENCOUNTER
Caller: Dana Juarez    Relationship: Self    Best call back number: [phone]    Requested Prescriptions:   Requested Prescriptions     Pending Prescriptions Disp Refills   • venlafaxine XR (EFFEXOR-XR) 75 MG 24 hr capsule 30 capsule 11     Sig: Take 1 capsule by mouth Daily.        Pharmacy where request should be sent: [facility]/PH[facility]ACY #[id number] - [place], [place] - [address] [phone] [facility] [phone] FX     Additional details provided by patient: PATIENT HAS BEEN OUT SINCE 09/05/22 AND NEEDS FILLED ASAP. PLEASE ADVISE     Does the patient have less than a 3 day supply:  [x] Yes  [] No    Pernell Kerr Rep   09/06/22 11:13 EDT

## 2022-11-02 ENCOUNTER — TELEPHONE (OUTPATIENT)
Dept: FAMILY MEDICINE CLINIC | Facility: CLINIC | Age: 27
End: 2022-11-02

## 2022-11-02 DIAGNOSIS — E66.9 CLASS 1 OBESITY WITHOUT SERIOUS COMORBIDITY WITH BODY MASS INDEX (BMI) OF 33.0 TO 33.9 IN ADULT, UNSPECIFIED OBESITY TYPE: ICD-10-CM

## 2022-11-02 NOTE — TELEPHONE ENCOUNTER
Caller: Dana Juarez    Relationship: Self    Best call back number: [phone]    Requested Prescriptions:   phentermine (ADIPEX-P) 37.5 MG tablet    Pharmacy where request should be sent: [id number] [place] [address] - [phone]  - [phone] FX     Additional details provided by patient: PLEASE REFILL OR CALL TO ADVISE.     Does the patient have less than a 3 day supply:  [] Yes  [x] No    Pernell Almazan Rep   11/02/22 15:31 EDT     THANK YOU.

## 2022-11-02 NOTE — TELEPHONE ENCOUNTER
Caller: Dana Juarez    Relationship: Self    Best call back number: [phone]    Requested Prescriptions:   venlafaxine XR (EFFEXOR-XR) 75 MG 24 hr capsule    Pharmacy where request should be sent: [facility]/PHARMACY #[id number] - [place], [place] - [address] [facility] [address] [phone] [facility] [phone] FX     Additional details provided by patient: PLEASE REFILL OR CALL TO ADVISE.     Does the patient have less than a 3 day supply:  [x] Yes  [] No    Pernell Almazan Rep   11/02/22 15:29 EDT     THANK YOU.

## 2022-11-03 NOTE — TELEPHONE ENCOUNTER
Elena,      You are due for an in office follow up to check your weight for documentation for the phentermine. We cannot refill this medication until you are seen in office. We also received a message about the venlafaxine, this was refilled on 09/06/2022 with 11 refills. You will need to con tact your pharmacy to obtain the refills.      Below is the confirmation from the pharmacy that they received the medication:         Sent to pharmacy as: Venlafaxine HCl ER 75 MG Oral Capsule Extended Release 24 Hour (EFFEXOR-XR)             Class: Normal             Route: Oral             E-Prescribing Status: Receipt confirmed by pharmacy (9/6/2022 11:45 AM EDT)          Notified in [facility]

## 2022-11-03 NOTE — TELEPHONE ENCOUNTER
Elena,     You are due for an in office follow up to check your weight for documentation for the phentermine. We cannot refill this medication until you are seen in office. We also received a message about the venlafaxine, this was refilled on 09/06/2022 with 11 refills. You will need to con tact your pharmacy to obtain the refills.     Below is the confirmation from the pharmacy that they received the medication:       Sent to pharmacy as: Venlafaxine HCl ER 75 MG Oral Capsule Extended Release 24 Hour (EFFEXOR-XR)        Class: Normal        Route: Oral        E-Prescribing Status: Receipt confirmed by pharmacy (9/6/2022 11:45 AM EDT)              MESSAGE SENT TO [employer]

## 2022-11-04 RX ORDER — VENLAFAXINE HYDROCHLORIDE 75 MG/1
CAPSULE, EXTENDED RELEASE ORAL
Qty: 30 CAPSULE | Refills: 11 | Status: SHIPPED | OUTPATIENT
Start: 2022-11-04

## 2023-02-27 ENCOUNTER — LAB (OUTPATIENT)
Dept: LAB | Facility: HOSPITAL | Age: 28
End: 2023-02-27
Payer: COMMERCIAL

## 2023-02-27 ENCOUNTER — OFFICE VISIT (OUTPATIENT)
Dept: FAMILY MEDICINE CLINIC | Facility: CLINIC | Age: 28
End: 2023-02-27
Payer: COMMERCIAL

## 2023-02-27 VITALS
OXYGEN SATURATION: 98 % | SYSTOLIC BLOOD PRESSURE: 122 MMHG | WEIGHT: 210 LBS | DIASTOLIC BLOOD PRESSURE: 64 MMHG | HEIGHT: 65 IN | HEART RATE: 71 BPM | BODY MASS INDEX: 34.99 KG/M2 | TEMPERATURE: 97.8 F

## 2023-02-27 DIAGNOSIS — R53.83 OTHER FATIGUE: Primary | ICD-10-CM

## 2023-02-27 DIAGNOSIS — N76.0 ACUTE VAGINITIS: ICD-10-CM

## 2023-02-27 LAB
25(OH)D3 SERPL-MCNC: 17.8 NG/ML (ref 30–100)
DEPRECATED RDW RBC AUTO: 40.2 FL (ref 37–54)
ERYTHROCYTE [DISTWIDTH] IN BLOOD BY AUTOMATED COUNT: 12.1 % (ref 12.3–15.4)
FOLATE SERPL-MCNC: 11.9 NG/ML (ref 4.78–24.2)
HCT VFR BLD AUTO: 41.5 % (ref 34–46.6)
HGB BLD-MCNC: 14.1 G/DL (ref 12–15.9)
MCH RBC QN AUTO: 30.7 PG (ref 26.6–33)
MCHC RBC AUTO-ENTMCNC: 34 G/DL (ref 31.5–35.7)
MCV RBC AUTO: 90.2 FL (ref 79–97)
PLATELET # BLD AUTO: 277 10*3/MM3 (ref 140–450)
PMV BLD AUTO: 10.9 FL (ref 6–12)
RBC # BLD AUTO: 4.6 10*6/MM3 (ref 3.77–5.28)
VIT B12 BLD-MCNC: 466 PG/ML (ref 211–946)
WBC NRBC COR # BLD: 5.99 10*3/MM3 (ref 3.4–10.8)

## 2023-02-27 PROCEDURE — 82607 VITAMIN B-12: CPT | Performed by: PHYSICIAN ASSISTANT

## 2023-02-27 PROCEDURE — 80053 COMPREHEN METABOLIC PANEL: CPT | Performed by: PHYSICIAN ASSISTANT

## 2023-02-27 PROCEDURE — 82746 ASSAY OF FOLIC ACID SERUM: CPT | Performed by: PHYSICIAN ASSISTANT

## 2023-02-27 PROCEDURE — 36415 COLL VENOUS BLD VENIPUNCTURE: CPT | Performed by: PHYSICIAN ASSISTANT

## 2023-02-27 PROCEDURE — 99214 OFFICE O/P EST MOD 30 MIN: CPT | Performed by: PHYSICIAN ASSISTANT

## 2023-02-27 PROCEDURE — 84466 ASSAY OF TRANSFERRIN: CPT | Performed by: PHYSICIAN ASSISTANT

## 2023-02-27 PROCEDURE — 85027 COMPLETE CBC AUTOMATED: CPT | Performed by: PHYSICIAN ASSISTANT

## 2023-02-27 PROCEDURE — 82306 VITAMIN D 25 HYDROXY: CPT | Performed by: PHYSICIAN ASSISTANT

## 2023-02-27 PROCEDURE — 84443 ASSAY THYROID STIM HORMONE: CPT | Performed by: PHYSICIAN ASSISTANT

## 2023-02-27 PROCEDURE — 83540 ASSAY OF IRON: CPT | Performed by: PHYSICIAN ASSISTANT

## 2023-02-27 RX ORDER — FLUCONAZOLE 200 MG/1
200 TABLET ORAL DAILY
Qty: 3 TABLET | Refills: 0 | Status: SHIPPED | OUTPATIENT
Start: 2023-02-27

## 2023-02-27 NOTE — PROGRESS NOTES
"Subjective   Dana Juarez is a 27 y.o. female  Fatigue (Increased increased fatigue over 1 month ) and Vaginal Itching (Vaginal itching and redness x3 days)      History of Present Illness    The patient, date of birth 1995, presents today with increased fatigue and also new symptom of vaginal irritation.    The patient reports that she has been experiencing fatigue for approximately 1.5 months. She denies being sick, having a fever, sore throat, gland swelling, diarrhea, blood in her stool, bladder or kidney problems, vomiting, nausea, food is going down, or weird headaches. The patient reports that she always has trouble falling asleep, but it is not worse than usual. She states that she is staying asleep as well as she typically does. The patient confirms body fatigue and feeling \"draggy\". She denies having heavy menstrual cycles. The patient reports that her Effexor is working fine. 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 The patient reports that she has had infectious mononucleosis in the past. She states that she is not a vegetarian.    The patient reports that she has had terrible vaginal recurrent stuff in years past. She states that she thinks it is just an imbalance. The patient reports that she is not having as much discharge, but it is itching. She states that she has used boric acid suppositories, and it did seem to help for the time being, but then it came back. The patient denies swelling. The patient confirm her previously having an intrauterine device caused her to have recurring yeast infections along with other vaginal problems. The patient denies any rash. The patient reports that she has not been on any antibiotics recently. The patient notes she works at [employer] and is often sweaty when she gets home.     The following portions of the patient's history were reviewed and updated as appropriate: " allergies, current medications, past social history and problem list    Review of Systems   Constitutional: Positive for fatigue.   HENT: Negative.    Respiratory: Negative.    Cardiovascular: Negative.    Gastrointestinal: Negative.    Endocrine: Negative.    Genitourinary: Positive for vaginal discharge and vaginal pain ( itching).   Musculoskeletal: Positive for arthralgias ( right wrist pain).   Skin: Negative.    Neurological: Negative.    Hematological: Negative.    Psychiatric/Behavioral: Negative.        Objective     Vitals:    02/27/23 1153   BP: 122/64   Pulse: 71   Temp: 97.8 °F (36.6 °C)   SpO2: 98%       Physical Exam  Vitals and nursing note reviewed.   Constitutional:       General: She is not in acute distress.     Appearance: Normal appearance. She is well-developed. She is not ill-appearing, toxic-appearing or diaphoretic.   HENT:      Head: Normocephalic and atraumatic.   Eyes:      Comments: No exopthalmos noted   Neck:      Thyroid: No thyroid mass, thyromegaly or thyroid tenderness.      Vascular: No carotid bruit or JVD.   Cardiovascular:      Rate and Rhythm: Normal rate and regular rhythm.      Pulses: Normal pulses.      Heart sounds: Normal heart sounds. No murmur heard.  Pulmonary:      Effort: Pulmonary effort is normal. No respiratory distress.      Breath sounds: Normal breath sounds.   Abdominal:      Palpations: Abdomen is soft.      Tenderness: There is no abdominal tenderness.   Genitourinary:     General: Normal vulva.      Vagina: Vaginal discharge ( scant milky white dc, cervix clear o/w) present.      Comments: Specimen taken for culture  Lymphadenopathy:      Cervical: No cervical adenopathy.   Skin:     General: Skin is warm and dry.      Findings: No rash.   Neurological:      Mental Status: She is alert and oriented to person, place, and time.      Coordination: Coordination normal.   Psychiatric:         Mood and Affect: Mood normal.         Behavior: Behavior normal.          Thought Content: Thought content normal.         Judgment: Judgment normal.         Assessment & Plan     1. Fatigue  - We will check a full thyroid panel, a comprehensive metabolic panel, glucose, electrolytes, blood protein, calcium, potassium, blood count, vitamin B, folate, and vitamin D.    2. Vaginal irritation  - We will swab the patient for culture.  - We will prescribe the patient Diflucan.    Diagnoses and all orders for this visit:    1. Other fatigue (Primary)  -     CBC (No Diff); Future  -     Vitamin B12; Future  -     Folate; Future  -     TSH; Future  -     Iron Profile; Future  -     Vitamin D,25-Hydroxy; Future  -     Comprehensive metabolic panel; Future    2. Acute vaginitis    Other orders  -     fluconazole (Diflucan) 200 MG tablet; Take 1 tablet by mouth Daily.  Dispense: 3 tablet; Refill: 0     Transcribed from ambient dictation for Marisol Pierce PA-C by Tashia Parham.  02/27/23   14:15 EST    Patient or patient representative verbalized consent to the visit recording.  I have personally performed the services described in this document as transcribed by the above individual, and it is both accurate and complete.  Marisol Pierce PA-C  2/27/2023  15:36 EST

## 2023-02-28 LAB
ALBUMIN SERPL-MCNC: 4.4 G/DL (ref 3.5–5.2)
ALBUMIN/GLOB SERPL: 1.8 G/DL
ALP SERPL-CCNC: 46 U/L (ref 39–117)
ALT SERPL W P-5'-P-CCNC: 11 U/L (ref 1–33)
ANION GAP SERPL CALCULATED.3IONS-SCNC: 7.9 MMOL/L (ref 5–15)
AST SERPL-CCNC: 23 U/L (ref 1–32)
BILIRUB SERPL-MCNC: 0.3 MG/DL (ref 0–1.2)
BUN SERPL-MCNC: 8 MG/DL (ref 6–20)
BUN/CREAT SERPL: 10.8 (ref 7–25)
CALCIUM SPEC-SCNC: 9.2 MG/DL (ref 8.6–10.5)
CHLORIDE SERPL-SCNC: 105 MMOL/L (ref 98–107)
CO2 SERPL-SCNC: 24.1 MMOL/L (ref 22–29)
CREAT SERPL-MCNC: 0.74 MG/DL (ref 0.57–1)
EGFRCR SERPLBLD CKD-EPI 2021: 115.3 ML/MIN/1.73
GLOBULIN UR ELPH-MCNC: 2.4 GM/DL
GLUCOSE SERPL-MCNC: 78 MG/DL (ref 65–99)
IRON 24H UR-MRATE: 96 MCG/DL (ref 37–145)
IRON SATN MFR SERPL: 23 % (ref 20–50)
POTASSIUM SERPL-SCNC: 4.1 MMOL/L (ref 3.5–5.2)
PROT SERPL-MCNC: 6.8 G/DL (ref 6–8.5)
SODIUM SERPL-SCNC: 137 MMOL/L (ref 136–145)
TIBC SERPL-MCNC: 411 MCG/DL (ref 298–536)
TRANSFERRIN SERPL-MCNC: 276 MG/DL (ref 200–360)
TSH SERPL DL<=0.05 MIU/L-ACNC: 2.32 UIU/ML (ref 0.27–4.2)

## 2023-11-10 RX ORDER — VENLAFAXINE HYDROCHLORIDE 75 MG/1
75 CAPSULE, EXTENDED RELEASE ORAL DAILY
Qty: 30 CAPSULE | Refills: 11 | Status: SHIPPED | OUTPATIENT
Start: 2023-11-10

## 2023-11-10 NOTE — TELEPHONE ENCOUNTER
Caller: MANFRED العلي    Relationship: Mother    Best call back number: [phone]    Requested Prescriptions:   Requested Prescriptions     Pending Prescriptions Disp Refills    venlafaxine XR (EFFEXOR-XR) 75 MG 24 hr capsule 30 capsule 11     Sig: Take 1 capsule by mouth Daily.        Pharmacy where request should be sent: [facility] - [address] - [phone]  - [phone] FX     Last office visit with prescribing clinician: 2/27/2023   Last telemedicine visit with prescribing clinician: Visit date not found   Next office visit with prescribing clinician: Visit date not found     Additional details provided by patient: PATIENT IS CHANGING PHARMACIES. PLEASE SEND TO THE ABOVE MAIL ORDER PHARMACY FOR A 90 DAY SUPPLY INSTEAD. PATIENT CHANGED INSURANCES THAT REQUIRE HER TO USE THE [facility].    Does the patient have less than a 3 day supply:  [x] Yes  [] No    Would you like a call back once the refill request has been completed: [] Yes [x] No    If the office needs to give you a call back, can they leave a voicemail: [] Yes [x] No    Pernell Gr Rep   11/10/23 10:43 EST

## 2023-11-10 NOTE — TELEPHONE ENCOUNTER
Caller: MANFRED العلي    Relationship: Mother    Best call back number: [phone]    Requested Prescriptions:   Requested Prescriptions     Pending Prescriptions Disp Refills    venlafaxine XR (EFFEXOR-XR) 75 MG 24 hr capsule 30 capsule 11     Sig: Take 1 capsule by mouth Daily.        Pharmacy where request should be sent: [facility] #[id number] [address] [facility] [address]  & . [facility] [phone] [facility] [phone] FX     Last office visit with prescribing clinician: 2/27/2023   Last telemedicine visit with prescribing clinician: Visit date not found   Next office visit with prescribing clinician: Visit date not found     Additional details provided by patient: PT IS OUT AND NEEDS REFILLS CALLED IN ASAP. PT HAS BEEN OUT FOR 3 DAYS    Does the patient have less than a 3 day supply:  [x] Yes  [] No    Would you like a call back once the refill request has been completed: [x] Yes [] No    If the office needs to give you a call back, can they leave a voicemail: [] Yes [] No    Pernell Cunningham Rep   11/10/23 10:24 EST

## 2023-12-11 RX ORDER — VENLAFAXINE HYDROCHLORIDE 75 MG/1
75 CAPSULE, EXTENDED RELEASE ORAL DAILY
Qty: 90 CAPSULE | Refills: 0 | Status: SHIPPED | OUTPATIENT
Start: 2023-12-11

## 2023-12-11 NOTE — TELEPHONE ENCOUNTER
----- Message from Dana Juarez sent at 12/8/2023  4:36 PM EST -----  Regarding: venlafaxine 75mg  Contact: [phone]  Carl Damon, thank you for sending in that prescription last month. I’ve actually been having a lot of trouble with that pharmacy I gave you (they charged me extra and said one thing then gave another, things like that). I was hoping, if possible, if for this month you could resend the prescription (for a 3 months supply) to a different pharmacy. It [facility] be the [facility] on [address] [place] [address] in [place] [address]. Thank you so much, I really appreciate it!     [email or web address]

## 2024-12-19 RX ORDER — VENLAFAXINE HYDROCHLORIDE 75 MG/1
75 CAPSULE, EXTENDED RELEASE ORAL DAILY
Qty: 30 CAPSULE | Refills: 0 | Status: SHIPPED | OUTPATIENT
Start: 2024-12-19

## 2024-12-19 NOTE — TELEPHONE ENCOUNTER
"     Caller: Dana Juarez \"Elena\"    Relationship: Self    Best call back number:  [phone]    Requested Prescriptions:   Requested Prescriptions     Pending Prescriptions Disp Refills    venlafaxine XR (EFFEXOR-XR) 75 MG 24 hr capsule 90 capsule 0     Sig: Take 1 capsule by mouth Daily. Appointment required for additional refills        Pharmacy where request should be sent: [facility] #[id number] - [facility] [address] [facility] [address] [facility] [phone] [facility] [phone] FX     Last office visit with prescribing clinician: 2/27/2023   Last telemedicine visit with prescribing clinician: Visit date not found   Next office visit with prescribing clinician: 1/3/2025     Additional details provided by patient: SHE ONLY BROUGHT A SMALL AMOUNT WITH HER HOME FOR [place] AND SHE DOES NOT HAVE ANY MORE. PLEASE CALL IN ASAP    Does the patient have less than a 3 day supply:  [x] Yes  [] No    Would you like a call back once the refill request has been completed: [x] Yes [] No    If the office needs to give you a call back, can they leave a voicemail: [x] Yes [] No    Pernell Cunningham Rep   12/19/24 16:31 EST          "

## 2024-12-19 NOTE — TELEPHONE ENCOUNTER
Pharmacy where request should be sent: [facility] #[id number] - [place], [place] - [address] AT [facility] [address] [facility] [phone] [facility] [phone] FX      Last office visit with prescribing clinician: 2/27/2023   Last telemedicine visit with prescribing clinician: Visit date not found   Next office visit with prescribing clinician: 1/3/2025      Additional details provided by patient: SHE ONLY BROUGHT A SMALL AMOUNT WITH HER HOME FOR [place] AND SHE DOES NOT HAVE ANY MORE. PLEASE CALL IN ASAP

## 2025-01-03 ENCOUNTER — OFFICE VISIT (OUTPATIENT)
Dept: FAMILY MEDICINE CLINIC | Facility: CLINIC | Age: 30
End: 2025-01-03

## 2025-01-03 VITALS
OXYGEN SATURATION: 99 % | BODY MASS INDEX: 43.05 KG/M2 | HEIGHT: 65 IN | TEMPERATURE: 98.6 F | DIASTOLIC BLOOD PRESSURE: 70 MMHG | WEIGHT: 258.4 LBS | SYSTOLIC BLOOD PRESSURE: 134 MMHG | HEART RATE: 82 BPM

## 2025-01-03 DIAGNOSIS — E66.01 CLASS 3 SEVERE OBESITY WITH BODY MASS INDEX (BMI) OF 40.0 TO 44.9 IN ADULT, UNSPECIFIED OBESITY TYPE, UNSPECIFIED WHETHER SERIOUS COMORBIDITY PRESENT: ICD-10-CM

## 2025-01-03 DIAGNOSIS — F41.1 GAD (GENERALIZED ANXIETY DISORDER): Primary | ICD-10-CM

## 2025-01-03 DIAGNOSIS — D17.22 LIPOMA OF LEFT UPPER EXTREMITY: ICD-10-CM

## 2025-01-03 DIAGNOSIS — N92.6 MENSTRUAL IRREGULARITY: ICD-10-CM

## 2025-01-03 DIAGNOSIS — L30.9 ECZEMA, UNSPECIFIED TYPE: ICD-10-CM

## 2025-01-03 DIAGNOSIS — E66.813 CLASS 3 SEVERE OBESITY WITH BODY MASS INDEX (BMI) OF 40.0 TO 44.9 IN ADULT, UNSPECIFIED OBESITY TYPE, UNSPECIFIED WHETHER SERIOUS COMORBIDITY PRESENT: ICD-10-CM

## 2025-01-03 RX ORDER — PHENTERMINE HYDROCHLORIDE 37.5 MG/1
37.5 TABLET ORAL
Qty: 30 TABLET | Refills: 2 | Status: SHIPPED | OUTPATIENT
Start: 2025-01-03

## 2025-01-03 RX ORDER — VENLAFAXINE HYDROCHLORIDE 75 MG/1
75 CAPSULE, EXTENDED RELEASE ORAL DAILY
Qty: 90 CAPSULE | Refills: 3 | Status: SHIPPED | OUTPATIENT
Start: 2025-01-03

## 2025-01-03 NOTE — PROGRESS NOTES
"Subjective   Dana Juarez is a 29 y.o. female  Anxiety (Refill on effexor)      History of Present Illness  History of Present Illness  The patient is a 29-year-old female who presents today for follow-up on generalized anxiety disorder and for medical management of her venlafaxine.    She has been experiencing difficulties due to a change in her insurance provider but has managed to maintain her medication supply. She recalls a previous attempt to self-taper off venlafaxine, which resulted in severe adverse effects including psychosis and hallucinations. She also reports experiencing \"brain zaps\" if she misses a dose by as little as 2 hours. Despite these challenges, she believes the medication is effectively managing her anxiety and expresses a desire to continue its use for at least another few years.    She has been grappling with weight issues and suspects she may have Polycystic Ovary Syndrome (PCOS). She reports irregular menstrual cycles, abnormal hair growth, and hair thinning. She has not yet consulted a gynecologist for these symptoms. She is considering resuming phentermine or trying Ozempic for weight management but is uncertain about her insurance coverage for these medications. She has not yet checked it. She recalls experiencing mild stomach discomfort with phentermine capsules but found the tablets to be more tolerable.    She has a palpable mass on her hand, which she suspects to be a lipoma.    Additionally, she has noticed a skin lesion that she would like to have evaluated.    MEDICATIONS  venlafaxine, phentermine    The following portions of the patient's history were reviewed and updated as appropriate: allergies, current medications, past social history and problem list    Review of Systems   Constitutional:  Positive for unexpected weight change. Negative for activity change, appetite change and fatigue.   Respiratory:  Negative for chest tightness and shortness of breath.  "   Cardiovascular:  Negative for chest pain.   Gastrointestinal:  Negative for abdominal distention, abdominal pain, diarrhea and nausea.   Skin:  Positive for color change.   Neurological:  Negative for dizziness, tremors, weakness, light-headedness and headaches.   Psychiatric/Behavioral:  Negative for agitation, behavioral problems, confusion, decreased concentration, dysphoric mood, sleep disturbance and suicidal ideas. The patient is nervous/anxious (stable on medication).        Objective     Vitals:    01/03/25 1503   BP: 134/70   Pulse: 82   Temp: 98.6 °F (37 °C)   SpO2: 99%       Physical Exam  Vitals and nursing note reviewed.   Constitutional:       General: She is not in acute distress.     Appearance: Normal appearance. She is well-developed. She is obese. She is not ill-appearing, toxic-appearing or diaphoretic.      Comments: [id number] noted     Neck:      Thyroid: No thyromegaly.   Cardiovascular:      Rate and Rhythm: Normal rate and regular rhythm.      Pulses: Normal pulses.      Heart sounds: Normal heart sounds. No murmur heard.  Pulmonary:      Effort: Pulmonary effort is normal. No respiratory distress.      Breath sounds: Normal breath sounds.   Abdominal:      Palpations: Abdomen is soft. There is no mass.      Tenderness: There is no abdominal tenderness.   Skin:     Findings: Lesion (lipoma 2cm Left forearm, nontender) and rash (eczema, mild lower right breast) present.   Neurological:      Mental Status: She is alert.      Sensory: No sensory deficit.   Psychiatric:         Mood and Affect: Mood normal.         Behavior: Behavior normal.         Thought Content: Thought content normal.         Judgment: Judgment normal.       Physical Exam      Assessment & Plan   Assessment & Plan  1. Generalized anxiety disorder.  She reports that venlafaxine is working well for her anxiety. A prescription for venlafaxine 75 mg has been issued, with a 90-day supply to be filled at [facility] in  [place], [place]. She is advised to continue her current dosage. If she decides to discontinue the medication in the future, a gradual tapering plan will be implemented to avoid severe withdrawal symptoms.    2. Suspected polycystic ovary syndrome (PCOS).  She reports irregular periods, hair growth abnormalities, and thinning hair, which are suggestive of PCOS. A referral to a gynecologist has been made for further evaluation and hormonal assessment. She is advised to follow up with the gynecologist in April.    3. Weight management.  She is advised to adhere to a high-protein, low-calorie diet, ensure adequate fiber and water intake, and maintain regular exercise. A prescription for phentermine has been issued, with a 3-month supply to be filled at [facility] in [place], [place]. She is instructed to start with half a dose of phentermine and gradually increase it as tolerated. If her insurance covers weight loss medications, alternative options like Ozempic can be considered.    4. Lipoma.  The mass on her hand is consistent with a lipoma. She is advised to monitor the lipoma for any changes such as growth, numbness, or pain. If these symptoms occur, a referral to a hand surgeon will be considered for potential removal.    5. Eczema.  The skin lesion exhibits a scaly appearance, indicative of eczema. She is advised to apply hydrocortisone cream to the affected area.    Diagnoses and all orders for this visit:    1. SHAILESH (generalized anxiety disorder) (Primary)    2. Class 3 severe obesity with body mass index (BMI) of 40.0 to 44.9 in adult, unspecified obesity type, unspecified whether serious comorbidity present  -     phentermine (ADIPEX-P) 37.5 MG tablet; Take 1 tablet by mouth Every Morning Before Breakfast. BMI 43  Dispense: 30 tablet; Refill: 2    3. Menstrual irregularity  -     Ambulatory Referral to Obstetrics / Gynecology    4. Lipoma of left upper extremity    5. Eczema, unspecified  type    Other orders  -     venlafaxine XR (EFFEXOR-XR) 75 MG 24 hr capsule; Take 1 capsule by mouth Daily.  Dispense: 90 capsule; Refill: 3     As part of this patient's treatment plan, patient will be prescribed controlled substances. The patient has been made aware of appropriate use of such medications, including potential risk of somnolence, limited ability to drive and /or work safely, and potential for dependence or overdose. It has also been made clear that these medications are for use by this patient only, without concomitant use of alcohol or other substances unless prescribed.Controlled substance status of medication discussed with patient, discussed risks of medication including abuse potential and diversion potential and need to follow up for reevaluation appointment in order to receive further refills.    Part of this note may be an electronic transcription/translation of spoken language to printed text using the Dragon Dictation System.        Patient or patient representative verbalized consent for the use of Ambient Listening during the visit with  Marisol Pierce PA-C for chart documentation. 1/3/2025  16:16 EST